# Patient Record
Sex: FEMALE | Race: WHITE | Employment: OTHER | URBAN - METROPOLITAN AREA
[De-identification: names, ages, dates, MRNs, and addresses within clinical notes are randomized per-mention and may not be internally consistent; named-entity substitution may affect disease eponyms.]

---

## 2017-01-10 ENCOUNTER — ALLSCRIPTS OFFICE VISIT (OUTPATIENT)
Dept: OTHER | Facility: OTHER | Age: 52
End: 2017-01-10

## 2017-02-15 ENCOUNTER — ALLSCRIPTS OFFICE VISIT (OUTPATIENT)
Dept: OTHER | Facility: OTHER | Age: 52
End: 2017-02-15

## 2017-03-07 ENCOUNTER — ALLSCRIPTS OFFICE VISIT (OUTPATIENT)
Dept: OTHER | Facility: OTHER | Age: 52
End: 2017-03-07

## 2017-03-07 DIAGNOSIS — R10.2 PELVIC AND PERINEAL PAIN: ICD-10-CM

## 2017-03-07 LAB
BILIRUB UR QL STRIP: NORMAL
CLARITY UR: NORMAL
COLOR UR: NORMAL
GLUCOSE (HISTORICAL): 50
HCG, QUALITATIVE (HISTORICAL): NEGATIVE
HGB UR QL STRIP.AUTO: NORMAL
KETONES UR STRIP-MCNC: NORMAL MG/DL
LEUKOCYTE ESTERASE UR QL STRIP: 25
NITRITE UR QL STRIP: NORMAL
PH UR STRIP.AUTO: 6.5 [PH]
PROT UR STRIP-MCNC: NORMAL MG/DL
SP GR UR STRIP.AUTO: 1.02
UROBILINOGEN UR QL STRIP.AUTO: NORMAL

## 2017-03-09 LAB
CULTURE RESULT (HISTORICAL): NORMAL
MISCELLANEOUS LAB TEST RESULT (HISTORICAL): NORMAL

## 2017-03-21 ENCOUNTER — HOSPITAL ENCOUNTER (OUTPATIENT)
Dept: RADIOLOGY | Facility: HOSPITAL | Age: 52
Discharge: HOME | End: 2017-03-21
Attending: FAMILY MEDICINE
Payer: COMMERCIAL

## 2017-03-21 DIAGNOSIS — R10.2 PELVIC AND PERINEAL PAIN: ICD-10-CM

## 2017-03-21 PROCEDURE — 76830 TRANSVAGINAL US NON-OB: CPT

## 2017-03-21 PROCEDURE — 76856 US EXAM PELVIC COMPLETE: CPT

## 2017-04-04 ENCOUNTER — ALLSCRIPTS OFFICE VISIT (OUTPATIENT)
Dept: OTHER | Facility: OTHER | Age: 52
End: 2017-04-04

## 2017-06-20 ENCOUNTER — ALLSCRIPTS OFFICE VISIT (OUTPATIENT)
Dept: OTHER | Facility: OTHER | Age: 52
End: 2017-06-20

## 2017-06-20 DIAGNOSIS — M54.6 PAIN IN THORACIC SPINE: ICD-10-CM

## 2017-06-20 DIAGNOSIS — M54.2 CERVICALGIA: ICD-10-CM

## 2017-06-21 ENCOUNTER — TRANSCRIBE ORDERS (OUTPATIENT)
Dept: ADMINISTRATIVE | Facility: HOSPITAL | Age: 52
End: 2017-06-21

## 2017-06-21 ENCOUNTER — HOSPITAL ENCOUNTER (OUTPATIENT)
Dept: RADIOLOGY | Facility: HOSPITAL | Age: 52
Discharge: HOME | End: 2017-06-21
Attending: FAMILY MEDICINE
Payer: COMMERCIAL

## 2017-06-21 DIAGNOSIS — M54.2 CERVICALGIA: ICD-10-CM

## 2017-06-21 PROCEDURE — 72050 X-RAY EXAM NECK SPINE 4/5VWS: CPT

## 2017-06-21 PROCEDURE — 72072 X-RAY EXAM THORAC SPINE 3VWS: CPT

## 2017-07-25 ENCOUNTER — ALLSCRIPTS OFFICE VISIT (OUTPATIENT)
Dept: OTHER | Facility: OTHER | Age: 52
End: 2017-07-25

## 2017-08-02 ENCOUNTER — APPOINTMENT (OUTPATIENT)
Dept: RADIOLOGY | Facility: CLINIC | Age: 52
End: 2017-08-02
Payer: COMMERCIAL

## 2017-08-02 ENCOUNTER — ALLSCRIPTS OFFICE VISIT (OUTPATIENT)
Dept: OTHER | Facility: OTHER | Age: 52
End: 2017-08-02

## 2017-08-02 DIAGNOSIS — M54.9 DORSALGIA: ICD-10-CM

## 2017-08-02 PROCEDURE — 72100 X-RAY EXAM L-S SPINE 2/3 VWS: CPT

## 2017-08-14 ENCOUNTER — APPOINTMENT (OUTPATIENT)
Dept: PHYSICAL THERAPY | Facility: CLINIC | Age: 52
End: 2017-08-14
Payer: COMMERCIAL

## 2017-08-14 PROCEDURE — 97162 PT EVAL MOD COMPLEX 30 MIN: CPT

## 2017-08-16 ENCOUNTER — GENERIC CONVERSION - ENCOUNTER (OUTPATIENT)
Dept: OTHER | Facility: OTHER | Age: 52
End: 2017-08-16

## 2017-08-21 ENCOUNTER — APPOINTMENT (OUTPATIENT)
Dept: PHYSICAL THERAPY | Facility: CLINIC | Age: 52
End: 2017-08-21
Payer: COMMERCIAL

## 2017-08-21 PROCEDURE — 97110 THERAPEUTIC EXERCISES: CPT

## 2017-08-21 PROCEDURE — 97140 MANUAL THERAPY 1/> REGIONS: CPT

## 2017-08-23 ENCOUNTER — APPOINTMENT (OUTPATIENT)
Dept: PHYSICAL THERAPY | Facility: CLINIC | Age: 52
End: 2017-08-23
Payer: COMMERCIAL

## 2017-08-23 PROCEDURE — 97140 MANUAL THERAPY 1/> REGIONS: CPT

## 2017-08-23 PROCEDURE — 97110 THERAPEUTIC EXERCISES: CPT

## 2017-08-28 ENCOUNTER — APPOINTMENT (OUTPATIENT)
Dept: PHYSICAL THERAPY | Facility: CLINIC | Age: 52
End: 2017-08-28
Payer: COMMERCIAL

## 2017-08-28 PROCEDURE — 97110 THERAPEUTIC EXERCISES: CPT

## 2017-08-28 PROCEDURE — 97140 MANUAL THERAPY 1/> REGIONS: CPT

## 2017-08-30 ENCOUNTER — APPOINTMENT (OUTPATIENT)
Dept: PHYSICAL THERAPY | Facility: CLINIC | Age: 52
End: 2017-08-30
Payer: COMMERCIAL

## 2017-08-30 PROCEDURE — 97110 THERAPEUTIC EXERCISES: CPT

## 2017-09-07 ENCOUNTER — TRANSCRIBE ORDERS (OUTPATIENT)
Dept: ADMINISTRATIVE | Facility: HOSPITAL | Age: 52
End: 2017-09-07

## 2017-09-07 DIAGNOSIS — Z12.31 VISIT FOR SCREENING MAMMOGRAM: Primary | ICD-10-CM

## 2017-09-12 ENCOUNTER — HOSPITAL ENCOUNTER (OUTPATIENT)
Dept: RADIOLOGY | Facility: HOSPITAL | Age: 52
Discharge: HOME | End: 2017-09-12
Attending: OBSTETRICS & GYNECOLOGY
Payer: COMMERCIAL

## 2017-09-12 DIAGNOSIS — Z12.31 VISIT FOR SCREENING MAMMOGRAM: ICD-10-CM

## 2017-09-12 PROCEDURE — 77063 BREAST TOMOSYNTHESIS BI: CPT

## 2017-09-12 PROCEDURE — G0202 SCR MAMMO BI INCL CAD: HCPCS

## 2017-10-12 ENCOUNTER — GENERIC CONVERSION - ENCOUNTER (OUTPATIENT)
Dept: OTHER | Facility: OTHER | Age: 52
End: 2017-10-12

## 2017-10-27 ENCOUNTER — GENERIC CONVERSION - ENCOUNTER (OUTPATIENT)
Dept: OTHER | Facility: OTHER | Age: 52
End: 2017-10-27

## 2017-11-27 ENCOUNTER — ALLSCRIPTS OFFICE VISIT (OUTPATIENT)
Dept: OTHER | Facility: OTHER | Age: 52
End: 2017-11-27

## 2017-11-27 DIAGNOSIS — G89.4 CHRONIC PAIN SYNDROME: ICD-10-CM

## 2017-11-27 DIAGNOSIS — M54.50 LOW BACK PAIN: ICD-10-CM

## 2017-11-27 DIAGNOSIS — M54.16 RADICULOPATHY OF LUMBAR REGION: ICD-10-CM

## 2017-12-14 ENCOUNTER — GENERIC CONVERSION - ENCOUNTER (OUTPATIENT)
Dept: OTHER | Facility: OTHER | Age: 52
End: 2017-12-14

## 2017-12-21 ENCOUNTER — GENERIC CONVERSION - ENCOUNTER (OUTPATIENT)
Dept: OTHER | Facility: OTHER | Age: 52
End: 2017-12-21

## 2017-12-22 ENCOUNTER — HOSPITAL ENCOUNTER (OUTPATIENT)
Dept: RADIOLOGY | Facility: HOSPITAL | Age: 52
Discharge: HOME | End: 2017-12-22
Attending: ANESTHESIOLOGY
Payer: COMMERCIAL

## 2017-12-22 DIAGNOSIS — M54.50 LOW BACK PAIN: ICD-10-CM

## 2017-12-22 DIAGNOSIS — G89.4 CHRONIC PAIN SYNDROME: ICD-10-CM

## 2017-12-22 DIAGNOSIS — M54.16 RADICULOPATHY OF LUMBAR REGION: ICD-10-CM

## 2017-12-22 PROCEDURE — 72148 MRI LUMBAR SPINE W/O DYE: CPT

## 2018-01-02 ENCOUNTER — GENERIC CONVERSION - ENCOUNTER (OUTPATIENT)
Dept: OTHER | Facility: OTHER | Age: 53
End: 2018-01-02

## 2018-01-08 ENCOUNTER — GENERIC CONVERSION - ENCOUNTER (OUTPATIENT)
Dept: OTHER | Facility: OTHER | Age: 53
End: 2018-01-08

## 2018-01-08 ENCOUNTER — ALLSCRIPTS OFFICE VISIT (OUTPATIENT)
Dept: OTHER | Facility: OTHER | Age: 53
End: 2018-01-08

## 2018-01-08 DIAGNOSIS — M25.571 PAIN IN RIGHT ANKLE: ICD-10-CM

## 2018-01-09 NOTE — CONSULTS
Assessment   1  Lumbar radiculopathy (724 4) (M54 16)   2  Right ankle pain (719 47) (M25 571)    Plan    · Meloxicam 7 5 MG Oral Tablet; TAKE 1 TABLET TWICE DAILY WITH FOOD   · *1 - [facility]  *  Status: Active  Requested for: 87ACO9944  Care Summary provided  : Yes   · Follow-up PRN Evaluation and Treatment  Follow-up  Status: Complete  Done:    98OBP9908 04:10PM   · We recommend that you stretch your plantar fascia using a step or stair  Do this exercise    10 times in a row, 3 times a day , and hold the stretch for 10 seconds each    time ; Status:Complete;   Done: 02HJT5869 04:10PM   · Orthotics B/L; Status:Complete;   Done: 58IEK2813 04:10PM    Discussion/Summary   The patient was counseled regarding diagnostic results,-- instructions for management,-- prognosis,-- patient and family education,-- risks and benefits of treatment options  Patient is able to Self-Care  Possible side effects of new medications were reviewed with the patient/guardian today  The treatment plan was reviewed with the patient/guardian  The patient/guardian understands and agrees with the treatment plan      Chief Complaint   Right ankle pain      History of Present Illness   HPI: Patient has pain in her right ankle and foot area  This has been ongoing for 1 month  No history of trauma  Review of Systems      ROS reviewed  Constitutional: no fever,-- no recent weight gain-- and-- no recent weight loss  Eyes: no double vision-- and-- no blurry vision  Cardiovascular: no chest pain,-- no palpitations-- and-- no lower extremity edema  Respiratory: no complaints of shortness of breath-- and-- no wheezing  Musculoskeletal: no difficulty walking,-- no muscle weakness,-- no joint stiffness,-- no joint swelling,-- no limb swelling`,-- no pain in extremity-- and-- no decreased range of motion        Neurological: no dizziness,-- no difficulty swallowing,-- no memory loss,-- no loss of consciousness-- and-- no seizures  Gastrointestinal: no nausea,-- no vomiting,-- no constipation-- and-- no diarrhea  Genitourinary: no difficulty initiating urine stream,-- no genital pain-- and-- no frequent urination  Integumentary: no complaints of skin rash  Psychiatric: no depression  Endocrine: no excessive thirst,-- no adrenal disease,-- no hypothyroidism-- and-- no hyperthyroidism  Hematologic/Lymphatic: no tendency for easy bruising-- and-- no tendency for easy bleeding  Active Problems   1  Acquired ankle/foot deformity (736 70) (M21 969)   2  Alopecia areata (704 01) (L63 9)   3  Back pain (724 5) (M54 9)   4  Bilateral sciatica (724 3) (M54 31,M54 32)   5  Bony exostosis (726 91) (M89 8X9)   6  Chronic low back pain (724 2,338 29) (M54 5,G89 29)   7  Chronic pain syndrome (338 4) (G89 4)   8  Esophageal reflux (530 81) (K21 9)   9  Influenza vaccination declined (V64 06) (Z28 21)   10  Lumbar radiculopathy (724 4) (M54 16)   11  Migraine (346 90) (G43 909)   12  Pes planus, congenital (754 61) (Q66 50)   13  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)   14  Right ankle pain (719 47) (M25 571)   15  Subconjunctival hemorrhage of left eye (372 72) (H11 32)   16   Thoracic spine pain (724 1) (M54 6)    Past Medical History    · History of Acute back pain (724 5) (M54 9)   · History of Difficulty walking (719 7) (R26 2)   · History of acute pharyngitis (V12 69) (Z87 09)   · History of allergic rhinitis (V12 69) (Z87 09)   · History of conjunctivitis (V12 49) (Z86 69)   · History of contact dermatitis (V13 3) (Z87 2)   · History of screening mammography (V15 89) (Z92 89)   · History of vaginal bleeding (V13 29) (Z87 42)   · History of vaginal discharge (V13 29) (Z87 42)   · History of Left lumbar radiculopathy (724 4) (M54 16)   · History of Left shoulder pain (719 41) (M25 512)   · History of Leg pain (729 5) (M79 606)   · History of Normal breast exam   · History of Pelvic pain in female (625 9) (R10 2)   · History of Screening for depression (V79 0) (Z13 89)   · History of Skin rash (782 1) (R21)   · History of Trace mitral regurgitation by prior echocardiogram (424 0) (I34 0)   · History of Trace tricuspid regurgitation by prior echocardiogram (397 0) (I07 1)   · History of Vaginitis due to Candida (112 1) (B37 3)     The active problems and past medical history were reviewed and updated today  Surgical History    · History of Hysterectomy     The surgical history was reviewed and updated today  Family History   Aunt    · Family history of leukemia (V16 6) (Z80 6)  Uncle    · Family history of malignant melanoma of skin (V16 8) (Z80 8)  Family History    · Family history of heart disease (V17 49) (Z82 49)   · Family history of hypertension (V17 49) (Z82 49)   · Family history of thyroid disease (V18 19) (Z83 49)     The family history was reviewed and updated today  Social History    ·    · Never a smoker   · Occasional alcohol use  The social history was reviewed and updated today  The social history was reviewed and is unchanged  Current Meds    1  Daily Value Multivitamin Oral Tablet; Take 1 tablet daily Recorded   2  Ibuprofen 600 MG Oral Tablet; TAKE 1 TABLET Every 6 hours PRN pain After Meals; Therapy: 31EPR6865 to (Evaluate:21Apr2017)  Requested for: 50UGG0811; Last     Rx:07Mar2017 Ordered     The medication list was reviewed and updated today  Allergies   1  Penicillins  2  No Known Food Allergies   3  No Known Latex Allergies    Vitals    Recorded: 08NYM7522 03:52PM   Heart Rate 80   Respiration 17   Systolic 694   Diastolic 72   Height 5 ft 3 in   Weight 118 lb    BMI Calculated 20 9   BSA Calculated 1 55     Physical Exam   Left Foot: Appearance: Normal except as noted: excessive pronation-- and-- pes planus  Tenderness: None  Right Foot: Appearance: Normal except as noted: excessive pronation-- and-- pes planus   Tenderness: None except the lateral hindfoot,-- lateral midfoot-- and-- peroneal tendon  Left Ankle: ROM: Full  Motor: Normal     Right Ankle: Tenderness: None except the peroneal tendons-- and-- posterolateral region  ROM: Full  Motor: Normal     Neurological Exam: performed  Light touch was intact bilaterally  Vibratory sensation was intact bilaterally  Response to monofilament test was intact bilaterally  Deep tendon reflexes: patellar reflex present bilaterally-- and-- achilles reflex present bilaterally  Vascular Exam: performed Dorsalis pedis pulses were present bilaterally  Posterior tibial pulses were present bilaterally  Elevation Pallor: absent bilaterally  Dependence rubor was absent bilaterally  Edema: none  Toenails: All of the toenails were elongated-- and-- hypertrophied  Hyperkeratosis: present on both first toes  Shoe Gear Evaluation: performed ()  Recommendation(s): custom inlays  Results/Data   I personally reviewed the films/images/results in the office today  My interpretation follows  X-ray Review Mild rearfoot osteoarthritis noted  No evidence of fracture or bony mass  Procedure   X-rays taken  Patient will be referred for physical therapy  We will add anti-inflammatory medicine  She will stretch daily  Her feet have been casted for custom molded foot orthotics      Future Appointments      Date/Time Provider Specialty Site   01/18/2018 03:30 PM ARLENE Sandoval  Pain Management [facility]   03/19/2018 01:15 PM ARLENE Sandoval   Pain Management [facility] SPINE     Signatures    Electronically signed by : Marcela Villaseñor DPM; Jan 8 2018  4:11PM EST                       (Author)

## 2018-01-12 VITALS
HEART RATE: 84 BPM | TEMPERATURE: 98 F | DIASTOLIC BLOOD PRESSURE: 64 MMHG | HEIGHT: 63 IN | BODY MASS INDEX: 21.53 KG/M2 | SYSTOLIC BLOOD PRESSURE: 118 MMHG | WEIGHT: 121.5 LBS

## 2018-01-13 VITALS
OXYGEN SATURATION: 99 % | SYSTOLIC BLOOD PRESSURE: 130 MMHG | WEIGHT: 127.56 LBS | HEART RATE: 86 BPM | DIASTOLIC BLOOD PRESSURE: 80 MMHG | BODY MASS INDEX: 22.6 KG/M2 | HEIGHT: 63 IN | RESPIRATION RATE: 16 BRPM

## 2018-01-13 VITALS
SYSTOLIC BLOOD PRESSURE: 112 MMHG | WEIGHT: 131 LBS | DIASTOLIC BLOOD PRESSURE: 60 MMHG | BODY MASS INDEX: 23.21 KG/M2 | HEART RATE: 83 BPM | OXYGEN SATURATION: 98 % | HEIGHT: 63 IN | TEMPERATURE: 98.1 F | RESPIRATION RATE: 18 BRPM

## 2018-01-14 VITALS
DIASTOLIC BLOOD PRESSURE: 60 MMHG | HEIGHT: 63 IN | SYSTOLIC BLOOD PRESSURE: 102 MMHG | WEIGHT: 132 LBS | TEMPERATURE: 97.6 F | HEART RATE: 86 BPM | OXYGEN SATURATION: 99 % | BODY MASS INDEX: 23.39 KG/M2

## 2018-01-14 VITALS
HEIGHT: 63 IN | BODY MASS INDEX: 23.04 KG/M2 | WEIGHT: 130 LBS | HEART RATE: 76 BPM | RESPIRATION RATE: 16 BRPM | TEMPERATURE: 96.2 F | SYSTOLIC BLOOD PRESSURE: 98 MMHG | DIASTOLIC BLOOD PRESSURE: 60 MMHG

## 2018-01-14 VITALS
WEIGHT: 129.31 LBS | SYSTOLIC BLOOD PRESSURE: 110 MMHG | DIASTOLIC BLOOD PRESSURE: 68 MMHG | HEIGHT: 63 IN | OXYGEN SATURATION: 98 % | BODY MASS INDEX: 22.91 KG/M2 | HEART RATE: 75 BPM | RESPIRATION RATE: 18 BRPM | TEMPERATURE: 98.1 F

## 2018-01-14 VITALS
DIASTOLIC BLOOD PRESSURE: 79 MMHG | HEART RATE: 88 BPM | SYSTOLIC BLOOD PRESSURE: 125 MMHG | HEIGHT: 63 IN | WEIGHT: 127 LBS | BODY MASS INDEX: 22.5 KG/M2

## 2018-01-14 VITALS
RESPIRATION RATE: 18 BRPM | WEIGHT: 127 LBS | SYSTOLIC BLOOD PRESSURE: 120 MMHG | OXYGEN SATURATION: 99 % | BODY MASS INDEX: 22.5 KG/M2 | HEIGHT: 63 IN | DIASTOLIC BLOOD PRESSURE: 70 MMHG | HEART RATE: 76 BPM

## 2018-01-15 ENCOUNTER — GENERIC CONVERSION - ENCOUNTER (OUTPATIENT)
Dept: OTHER | Facility: OTHER | Age: 53
End: 2018-01-15

## 2018-01-18 ENCOUNTER — HOSPITAL ENCOUNTER (OUTPATIENT)
Facility: AMBULARY SURGERY CENTER | Age: 53
Setting detail: OUTPATIENT SURGERY
Discharge: HOME | End: 2018-01-18
Attending: ANESTHESIOLOGY | Admitting: ANESTHESIOLOGY
Payer: COMMERCIAL

## 2018-01-18 ENCOUNTER — APPOINTMENT (OUTPATIENT)
Dept: RADIOLOGY | Facility: HOSPITAL | Age: 53
End: 2018-01-18
Payer: COMMERCIAL

## 2018-01-18 VITALS
TEMPERATURE: 98 F | HEART RATE: 70 BPM | OXYGEN SATURATION: 99 % | RESPIRATION RATE: 18 BRPM | DIASTOLIC BLOOD PRESSURE: 80 MMHG | SYSTOLIC BLOOD PRESSURE: 135 MMHG

## 2018-01-18 PROBLEM — M51.17 INTERVERTEBRAL DISC DISORDER WITH RADICULOPATHY OF LUMBOSACRAL REGION: Status: ACTIVE | Noted: 2018-01-18

## 2018-01-18 PROBLEM — G89.4 CHRONIC PAIN SYNDROME: Status: ACTIVE | Noted: 2018-01-18

## 2018-01-18 PROBLEM — M54.50 LOW BACK PAIN: Status: ACTIVE | Noted: 2018-01-18

## 2018-01-18 PROCEDURE — 72020 X-RAY EXAM OF SPINE 1 VIEW: CPT

## 2018-01-18 RX ORDER — DIPHENOXYLATE HYDROCHLORIDE AND ATROPINE SULFATE 2.5; .025 MG/1; MG/1
1 TABLET ORAL DAILY
COMMUNITY
End: 2019-09-17

## 2018-01-18 RX ORDER — LIDOCAINE WITH 8.4% SOD BICARB 0.9%(10ML)
SYRINGE (ML) INJECTION AS NEEDED
Status: DISCONTINUED | OUTPATIENT
Start: 2018-01-18 | End: 2018-01-18 | Stop reason: HOSPADM

## 2018-01-18 RX ORDER — METHYLPREDNISOLONE ACETATE 80 MG/ML
INJECTION, SUSPENSION INTRA-ARTICULAR; INTRALESIONAL; INTRAMUSCULAR; SOFT TISSUE AS NEEDED
Status: DISCONTINUED | OUTPATIENT
Start: 2018-01-18 | End: 2018-01-18 | Stop reason: HOSPADM

## 2018-01-18 RX ORDER — BUPIVACAINE HYDROCHLORIDE 2.5 MG/ML
INJECTION, SOLUTION EPIDURAL; INFILTRATION; INTRACAUDAL AS NEEDED
Status: DISCONTINUED | OUTPATIENT
Start: 2018-01-18 | End: 2018-01-18 | Stop reason: HOSPADM

## 2018-01-18 NOTE — DISCHARGE INSTRUCTIONS
Epidural Steroid Injection   WHAT YOU NEED TO KNOW:   An epidural steroid injection (ROMAN) is a procedure to inject steroid medicine into the epidural space  The epidural space is between your spinal cord and vertebrae  Steroids reduce inflammation and fluid buildup in your spine that may be causing pain  You may be given pain medicine along with the steroids  ACTIVITY  · Do not drive or operate machinery today  · No strenuous activity today - bending, lifting, etc   · You may resume normal activites starting tomorrow - start slowly and as tolerated  · You may shower today, but no tub baths or hot tubs  · You may have numbness for several hours from the local anesthetic  Please use caution and common sense, especially with weight-bearing activities  CARE OF THE INJECTION SITE  · If you have soreness or pain, apply ice to the area today (20 minutes on/20 minutes off)  · Starting tomorrow, you may use warm, moist heat or ice if needed  · You may have an increase or change in your discomfort for 36-48 hours after your treatment  · Apply ice and continue with any pain medication you have been prescribed  · Notify [facility] if you have any of the following: redness, drainage, swelling, headache, stiff neck or fever above 100°F     SPECIAL INSTRUCTIONS  · Our office will contact you in approximately 7 days for a progress report  MEDICATIONS  · Continue to take all routine medications  · Our office may have instructed you to hold some medications  If you have a problem specifically related to your procedure, please call our office at [phone]  Problems not related to your procedure should be directed to your primary care physician

## 2018-01-18 NOTE — OP NOTE
ATTENDING PHYSICIAN:  Blas Gray MD     PROCEDURE:  1  Right L5 transforaminal epidural steroid injection under fluoroscopic guidance  2  Right S1 transforaminal epidural steroid injection under fluoroscopic guidance  PRE-PROCEDURE DIAGNOSIS:  Low back pain and right lower extremity radiculopathy  POST-PROCEDURE DIAGNOSIS:   Low back pain and right lower extremity radiculopathy  ANESTHESIA:  Local     ESTIMATED BLOOD LOSS:  Minimal     COMPLICATIONS:  None  LOCATION:  [address] [place] [address]  CONSENT:  Today's procedure, its potential benefits as well as its risks and potential side effects were reviewed  Discussed risks of the procedure including bleeding, infection, nerve irritation or damage, reactions to the medications, weakness, headache, failure of the pain to improve, and potential worsening of the pain were explained to the patient who verbalized understanding and who wished to proceed  Written informed consent was thereby obtained  DESCRIPTION OF THE PROCEDURE:  After written informed consent was obtained, the patient was taken to the fluoroscopy suite and placed in the prone position  Anatomical landmarks were identified by way of fluoroscopy in multiple views  The skin of the lumbar region was prepped using antiseptic and draped in the usual sterile fashion  Strict aseptic technique was utilized  The skin and subcutaneous tissues at the needle entry site were infiltrated with a total of 5 mL of 1% preservative-free lidocaine using a 25-gauge 1-1/2-inch needle  22-gauge needles were then incrementally advanced under fluoroscopic guidance in the oblique view into the neural foramina as mentioned above  Proper placement into each of the neural foramen was confirmed with fluoroscopy in both the lateral and AP views   After negative aspiration for CSF or heme, contrast was injected, which delineated the nerve roots and the epidural space under fluoroscopy in the AP view  There was only a transient pressure paresthesia that resolved immediately upon injection  After negative aspiration, a 2 mL of a 4 mL injectate consisting of 3 mL of preservative-free 0 25% bupivacaine and 1 mL of Depo-Medrol 80 mg/mL was slowly injected into each of the needles as delineated above  The patient tolerated the procedure well and all needles were removed intact  Hemostasis was maintained  There were no apparent paresthesias or complications  The skin was wiped clean and a Band-Aid was placed as appropriate  The patient was monitored for an appropriate period of time and remained hemodynamically stable following the procedure  The patient was ultimately discharged to home with supervision in good condition and instructed to call the office in approximately 7-10 days with an update or sooner as warranted  Discharge instructions were provided  I was present for and participated in all key and critical portions of this procedure      Yu Mckenzie MD  1/18/2018  4:01 PM

## 2018-01-22 VITALS
RESPIRATION RATE: 18 BRPM | DIASTOLIC BLOOD PRESSURE: 60 MMHG | SYSTOLIC BLOOD PRESSURE: 99 MMHG | HEIGHT: 63 IN | OXYGEN SATURATION: 100 % | WEIGHT: 126 LBS | BODY MASS INDEX: 22.32 KG/M2 | HEART RATE: 89 BPM

## 2018-01-23 VITALS
HEART RATE: 80 BPM | HEIGHT: 63 IN | RESPIRATION RATE: 17 BRPM | BODY MASS INDEX: 20.91 KG/M2 | WEIGHT: 118 LBS | DIASTOLIC BLOOD PRESSURE: 72 MMHG | SYSTOLIC BLOOD PRESSURE: 126 MMHG

## 2018-01-23 NOTE — MISCELLANEOUS
Message   Recorded as Task   Date: 12/26/2017 10:48 AM, Created By: [address]   Task Name: Care Coordination   Assigned To: Omar Higginbotham   Regarding Patient: Quinn Riddle, Status: In Progress   Comment:    Ya Oreilly - 26 Dec 2017 10:48 AM     TASK CREATED  MRI of the lumbar spine dated December 22, 2017 shows degenerative disc disease at L3-L4 and L4-L5 along with a central disc herniation at L5-S1  Since the patient's symptoms are primarily on the right side, I think she would benefit from a right L5 and right S1 transforaminal epidural steroid injection  If she is interested, please schedule:    (For [place])  Proc:  Right L5 and S1 TFESI  Dx:  M51 17, M54 5, G89 4  CPT:  [id number] and 76455   Maddie Coreas - 26 Dec 2017 1:48 PM     TASK EDITED  Attempted to reach pt LVMMOM with c/b #, office hours and location  Coreen Field - 27 Dec 2017 12:56 PM     TASK EDITED  Pt came into the office today , this nurse went over mri results injection was scheduled  Ya Oreilly - 27 Dec 2017 1:00 PM     TASK REPLIED TO: Previously Assigned To [place]  Thanks  Please send this to surg scheduling team to see if they need to get authorization  Coreen Field - 27 Dec 2017 2:06 PM     TASK REASSIGNED: Previously Assigned To Macdonald Sicard Fox,Amy - 28 Dec 2017 4:12 PM     TASK EDITED  noted, will look into auth for TFESI as noted below   Morelia Gan - 29 Dec 2017 3:24 PM     TASK EDITED  Scheduled for 1/18/18    [address] [place] [address] along with clinicals  Await response  Omar Ashraf - 33 Jan 2018 11:34 AM     TASK EDITED  Noted, will await fax response from ins company  Active Problems    1  Acquired ankle/foot deformity (736 70) (M21 969)   2  Alopecia areata (704 01) (L63 9)   3  Back pain (724 5) (M54 9)   4  Bilateral sciatica (724 3) (M54 31,M54 32)   5  Bony exostosis (726 91) (M89 8X9)   6  Chronic low back pain (724 2,338 29) (M54 5,G89 29)   7   Chronic pain syndrome (338 4) (G89 4)   8  Esophageal reflux (530 81) (K21 9)   9  Influenza vaccination declined (V64 06) (Z28 21)   10  Lumbar radiculopathy (724 4) (M54 16)   11  Migraine (346 90) (G43 909)   12  Pes planus, congenital (754 61) (Q66 50)   13  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)   14  Right ankle pain (719 47) (M25 571)   15  Subconjunctival hemorrhage of left eye (372 72) (H11 32)   16  Thoracic spine pain (724 1) (M54 6)    Current Meds   1  Daily Value Multivitamin Oral Tablet; Take 1 tablet daily Recorded   2  Ibuprofen 600 MG Oral Tablet; TAKE 1 TABLET Every 6 hours PRN pain After Meals; Therapy: 82USA0160 to (Evaluate:21Apr2017)  Requested for: 58FAC2301; Last   Rx:07Mar2017 Ordered    Allergies    1  Penicillins    2  No Known Food Allergies   3   No Known Latex Allergies    Signatures   Electronically signed by : Didi Guzman, ; Jan 2 2018 11:34AM EST                       (Author)

## 2018-01-23 NOTE — RESULT NOTES
Message   Recorded as Task   Date: 12/18/2017 08:24 AM, Created By: System   Task Name: Schedule Appointment   Assigned To: [facility] surgery sched,Team   Regarding Patient: Tenzin Herrera, Status: Active   Comment:    System - 18 Dec 2017 8:24 AM        Dee Phipps - 18 Dec 2017 8:26 AM     TASK REASSIGNED: Previously Assigned To Carol Gaitan #[id number] VALID FROM 11/30/17 TO 1/29/2018 PLEASE Elizabeth Perez - 18 Dec 2017 10:52 AM     TASK IN PROGRESS   Janee Gresham - 18 Dec 2017 1:25 PM     TASK EDITED  12/18/17 [facility] @ [phone]   Elizabeth Yañez - 21 Dec 2017 11:50 AM     TASK EDITED  12/22 dave aly

## 2018-01-23 NOTE — MISCELLANEOUS
Message   Recorded as Task   Date: 01/15/2018 11:39 AM, Created By: Keshawn Baez   Task Name: Call Back   Assigned To: [address] [facility] [facility] Clinical,Team   Regarding Patient: Tenzin Herrera, Status: Active   Comment:    Keshawn Baez - 15 Skinny 2018 11:39 AM     TASK CREATED  Patient came in [facility] office stating she has a procedure on 01/18 rt Tfesi  Pt states  her foot Dr gave her Meloxicam 7 5 mg to take once a day she started taking medication last week and suppose to take medication this week as well  She would like to know if she can still have procedure done or if she would have to cancel  Please call patient at   Carissa Gaston - 15 Skinny 2018 11:44 AM     TASK EDITED  LMOM to c/b, c/b# and OH given  Mound Fluke - 15 Skinny 9814 1:71 PM     TASK EDITED  patient returned your call  please call her at [phone]   [facility] - 15 Skinny 2018 3:44 PM     TASK EDITED  S/w pt  to advise her that it is okay that she is taking the Meloxicam, it doesn not need to be held for a TFESI  Pt  wanted to make sure, verbalized understanding  Active Problems    1  Acquired ankle/foot deformity (736 70) (M21 969)   2  Alopecia areata (704 01) (L63 9)   3  Back pain (724 5) (M54 9)   4  Bilateral sciatica (724 3) (M54 31,M54 32)   5  Bony exostosis (726 91) (M89 8X9)   6  Chronic low back pain (724 2,338 29) (M54 5,G89 29)   7  Chronic pain syndrome (338 4) (G89 4)   8  Esophageal reflux (530 81) (K21 9)   9  Influenza vaccination declined (V64 06) (Z28 21)   10  Lumbar radiculopathy (724 4) (M54 16)   11  Migraine (346 90) (G43 909)   12  Pes planus, congenital (754 61) (Q66 50)   13  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)   14  Right ankle pain (719 47) (M25 571)   15  Subconjunctival hemorrhage of left eye (372 72) (H11 32)   16  Thoracic spine pain (724 1) (M54 6)    Current Meds   1  Daily Value Multivitamin Oral Tablet; Take 1 tablet daily Recorded   2   Ibuprofen 600 MG Oral Tablet; TAKE 1 TABLET Every 6 hours PRN pain After Meals; Therapy: 89TYA6713 to (Evaluate:21Apr2017)  Requested for: 55PEY3368; Last   Rx:07Mar2017 Ordered   3  Meloxicam 7 5 MG Oral Tablet; TAKE 1 TABLET TWICE DAILY WITH FOOD; Therapy: 74OJP2544 to (Casimer Sacks)  Requested for: 78GAO7255; Last   Rx:08Jan2018 Ordered    Allergies    1  Penicillins    2  No Known Food Allergies   3   No Known Latex Allergies    Signatures   Electronically signed by : Christa York, ; Skinny 15 2018  3:45PM EST                       (Author)

## 2018-01-24 VITALS
BODY MASS INDEX: 20.91 KG/M2 | WEIGHT: 118 LBS | HEIGHT: 63 IN | HEART RATE: 88 BPM | DIASTOLIC BLOOD PRESSURE: 74 MMHG | TEMPERATURE: 97 F | SYSTOLIC BLOOD PRESSURE: 108 MMHG

## 2018-01-26 ENCOUNTER — TELEPHONE (OUTPATIENT)
Dept: PAIN MEDICINE | Facility: CLINIC | Age: 53
End: 2018-01-26

## 2018-01-26 NOTE — TELEPHONE ENCOUNTER
1ST ATTEMPT, LM ON VM NEEDED % OF RELIEF     F/U 03/19/2018        S/P L5 S1 TFESI W/ AS ON 1/18/2018

## 2018-01-29 NOTE — TELEPHONE ENCOUNTER
[facility]- message was left on anserve  I called patient back w/ no answer  LVM for patient to c/b       Given to:             [facility]    Reason: ROUTINE/OFFICE   Pt's Dr: Bert Tanner       For: OFFICE     2nd Call: NO        From: [address] [place] [address] [address]     Phone: [phone]   Ext:     Pt Name: Pj Sterling    Pt : 1965     Message: PT Kristine Bonilla

## 2018-03-01 ENCOUNTER — OFFICE VISIT (OUTPATIENT)
Dept: PODIATRY | Facility: CLINIC | Age: 53
End: 2018-03-01
Payer: COMMERCIAL

## 2018-03-01 VITALS
HEART RATE: 76 BPM | HEIGHT: 63 IN | DIASTOLIC BLOOD PRESSURE: 78 MMHG | SYSTOLIC BLOOD PRESSURE: 115 MMHG | WEIGHT: 117 LBS | BODY MASS INDEX: 20.73 KG/M2

## 2018-03-01 DIAGNOSIS — M76.71 PERONEAL TENDINITIS OF RIGHT LOWER EXTREMITY: ICD-10-CM

## 2018-03-01 DIAGNOSIS — M21.961 ACQUIRED DEFORMITY OF RIGHT FOOT: Primary | ICD-10-CM

## 2018-03-01 DIAGNOSIS — M25.571 ARTHRALGIA OF RIGHT FOOT: ICD-10-CM

## 2018-03-01 PROCEDURE — 99213 OFFICE O/P EST LOW 20 MIN: CPT | Performed by: PODIATRIST

## 2018-03-01 RX ORDER — MELOXICAM 7.5 MG/1
7.5 TABLET ORAL DAILY
Qty: 30 TABLET | Refills: 0 | Status: SHIPPED | OUTPATIENT
Start: 2018-03-01 | End: 2018-04-25 | Stop reason: ALTCHOICE

## 2018-03-01 NOTE — PROGRESS NOTES
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  Plan  Trigger point injection done to the area of the right lateral malleolus  1 cc of dexamethasone was injected without pain or complication  Patient's right arch was bound a low dye arch strapping fashion  No problem-specific Assessment & Plan notes found for this encounter  There are no diagnoses linked to this encounter  Subjective:      Patient ID: Michael Shields is a 50 y o  female  Patient has return of pain in her right foot and ankle  No history of trauma  She has pain with ambulation        The following portions of the patient's history were reviewed and updated as appropriate: allergies, current medications, past family history, past medical history, past social history, past surgical history and problem list     Review of Systems      Objective: Foot ExamPhysical Exam    Review of Systems      ROS reviewed  Constitutional: no fever,-- no recent weight gain-- and-- no recent weight loss  Eyes: no double vision-- and-- no blurry vision  Cardiovascular: no chest pain,-- no palpitations-- and-- no lower extremity edema  Respiratory: no complaints of shortness of breath-- and-- no wheezing  Musculoskeletal: no difficulty walking,-- no muscle weakness,-- no joint stiffness,-- no joint swelling,-- no limb swelling`,-- no pain in extremity-- and-- no decreased range of motion  Neurological: no dizziness,-- no difficulty swallowing,-- no memory loss,-- no loss of consciousness-- and-- no seizures  Gastrointestinal: no nausea,-- no vomiting,-- no constipation-- and-- no diarrhea  Genitourinary: no difficulty initiating urine stream,-- no genital pain-- and-- no frequent urination  Integumentary: no complaints of skin rash  Psychiatric: no depression        Endocrine: no excessive thirst,-- no adrenal disease,-- no hypothyroidism-- and-- no hyperthyroidism  Hematologic/Lymphatic: no tendency for easy bruising-- and-- no tendency for easy bleeding  Active Problems   1  Acquired ankle/foot deformity (736 70) (M21 969)   2  Alopecia areata (704 01) (L63 9)   3  Back pain (724 5) (M54 9)   4  Bilateral sciatica (724 3) (M54 31,M54 32)   5  Bony exostosis (726 91) (M89 8X9)   6  Chronic low back pain (724 2,338 29) (M54 5,G89 29)   7  Chronic pain syndrome (338 4) (G89 4)   8  Esophageal reflux (530 81) (K21 9)   9  Influenza vaccination declined (V64 06) (Z28 21)   10  Lumbar radiculopathy (724 4) (M54 16)   11  Migraine (346 90) (G43 909)   12  Pes planus, congenital (754 61) (Q66 50)   13  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)   14  Right ankle pain (719 47) (M25 571)   15  Subconjunctival hemorrhage of left eye (372 72) (H11 32)   16   Thoracic spine pain (724 1) (M54 6)     Past Medical History    · History of Acute back pain (724 5) (M54 9)   · History of Difficulty walking (719 7) (R26 2)   · History of acute pharyngitis (V12 69) (Z87 09)   · History of allergic rhinitis (V12 69) (Z87 09)   · History of conjunctivitis (V12 49) (Z86 69)   · History of contact dermatitis (V13 3) (Z87 2)   · History of screening mammography (V15 89) (Z92 89)   · History of vaginal bleeding (V13 29) (Z87 42)   · History of vaginal discharge (V13 29) (Z87 42)   · History of Left lumbar radiculopathy (724 4) (M54 16)   · History of Left shoulder pain (719 41) (M25 512)   · History of Leg pain (729 5) (M79 606)   · History of Normal breast exam   · History of Pelvic pain in female (625 9) (R10 2)   · History of Screening for depression (V79 0) (Z13 89)   · History of Skin rash (782 1) (R21)   · History of Trace mitral regurgitation by prior echocardiogram (424 0) (I34 0)   · History of Trace tricuspid regurgitation by prior echocardiogram (397 0) (I07 1)   · History of Vaginitis due to Candida (112 1) (B37 3)     The active problems and past medical history were reviewed and updated today  Surgical History    · History of Hysterectomy     The surgical history was reviewed and updated today  Family History   Aunt    · Family history of leukemia (V16 6) (Z80 6)  Uncle    · Family history of malignant melanoma of skin (V16 8) (Z80 8)  Family History    · Family history of heart disease (V17 49) (Z82 49)   · Family history of hypertension (V17 49) (Z82 49)   · Family history of thyroid disease (V18 19) (Z83 49)     The family history was reviewed and updated today  Social History    ·    · Never a smoker   · Occasional alcohol use  The social history was reviewed and updated today  The social history was reviewed and is unchanged  Current Meds    1  Daily Value Multivitamin Oral Tablet; Take 1 tablet daily Recorded   2  Ibuprofen 600 MG Oral Tablet; TAKE 1 TABLET Every 6 hours PRN pain After Meals; Therapy: [id number] to (Evaluate:21Apr2017)  Requested for: 37MEL9701; Last     Rx:07Mar2017 Ordered     The medication list was reviewed and updated today  Allergies   1  Penicillins  2  No Known Food Allergies   3  No Known Latex Allergies     Vitals     Recorded: 04NJV9314 03:52PM   Heart Rate 80   Respiration 17   Systolic 931   Diastolic 72   Height 5 ft 3 in   Weight 118 lb    BMI Calculated 20 9   BSA Calculated 1 55      Physical Exam   Left Foot: Appearance: Normal except as noted: excessive pronation-- and-- pes planus  Tenderness: None  Right Foot: Appearance: Normal except as noted: excessive pronation-- and-- pes planus  Tenderness: None except the lateral hindfoot,-- lateral midfoot-- and-- peroneal tendon  Left Ankle: ROM: Full  Motor: Normal     Right Ankle: Tenderness: None except the peroneal tendons-- and-- posterolateral region  ROM: Full  Motor: Normal     Neurological Exam: performed  Light touch was intact bilaterally  Vibratory sensation was intact bilaterally  Response to monofilament test was intact bilaterally  Deep tendon reflexes: patellar reflex present bilaterally-- and-- achilles reflex present bilaterally  Vascular Exam: performed Dorsalis pedis pulses were present bilaterally  Posterior tibial pulses were present bilaterally  Elevation Pallor: absent bilaterally  Dependence rubor was absent bilaterally  Edema: none  Toenails: All of the toenails were elongated-- and-- hypertrophied  Hyperkeratosis: present on both first toes  Shoe Gear Evaluation: performed ()  Recommendation(s): custom inlays  Results/Data   I personally reviewed the films/images/results in the office today  My interpretation follows  X-ray Review Mild rearfoot osteoarthritis noted  No evidence of fracture or bony mass

## 2018-03-19 ENCOUNTER — OFFICE VISIT (OUTPATIENT)
Dept: FAMILY MEDICINE CLINIC | Facility: CLINIC | Age: 53
End: 2018-03-19
Payer: COMMERCIAL

## 2018-03-19 VITALS
HEIGHT: 63 IN | BODY MASS INDEX: 21.97 KG/M2 | OXYGEN SATURATION: 100 % | TEMPERATURE: 97.7 F | HEART RATE: 89 BPM | SYSTOLIC BLOOD PRESSURE: 120 MMHG | WEIGHT: 124 LBS | DIASTOLIC BLOOD PRESSURE: 80 MMHG

## 2018-03-19 DIAGNOSIS — R31.9 URINARY TRACT INFECTION WITH HEMATURIA, SITE UNSPECIFIED: Primary | ICD-10-CM

## 2018-03-19 DIAGNOSIS — N39.0 URINARY TRACT INFECTION WITH HEMATURIA, SITE UNSPECIFIED: Primary | ICD-10-CM

## 2018-03-19 LAB
SL AMB  POCT GLUCOSE, UA: ABNORMAL
SL AMB LEUKOCYTE ESTERASE,UA: 500
SL AMB POCT BILIRUBIN,UA: ABNORMAL
SL AMB POCT BLOOD,UA: 50
SL AMB POCT CLARITY,UA: ABNORMAL
SL AMB POCT COLOR,UA: CLEAR
SL AMB POCT KETONES,UA: ABNORMAL
SL AMB POCT NITRITE,UA: ABNORMAL
SL AMB POCT PH,UA: 6
SL AMB POCT SPECIFIC GRAVITY,UA: 1.01
SL AMB POCT URINE PROTEIN: ABNORMAL
SL AMB POCT UROBILINOGEN: ABNORMAL

## 2018-03-19 PROCEDURE — 99213 OFFICE O/P EST LOW 20 MIN: CPT | Performed by: FAMILY MEDICINE

## 2018-03-19 PROCEDURE — 81002 URINALYSIS NONAUTO W/O SCOPE: CPT | Performed by: FAMILY MEDICINE

## 2018-03-19 RX ORDER — CIPROFLOXACIN 500 MG/1
500 TABLET, FILM COATED ORAL EVERY 12 HOURS SCHEDULED
Qty: 14 TABLET | Refills: 0 | Status: SHIPPED | OUTPATIENT
Start: 2018-03-19 | End: 2018-03-26

## 2018-03-19 NOTE — PROGRESS NOTES
Assessment/Plan:    Urinary tract infection with hematuria:  Increased frequency and dysuria x9 days  Tried AZO with mild improvement  Urine dip in office today shows positive blood and leukocytes, but negative nitrites  Urine sent for culture, patient is symptoms likely due to UTI  Will treat with ciprofloxacin 500 mg b i d  for 1 week  Will follow up urine culture for sensitivity  Diagnoses and all orders for this visit:    Urinary tract infection with hematuria, site unspecified  -     POCT urine dip  -     Urine culture  -     ciprofloxacin (CIPRO) 500 mg tablet; Take 1 tablet (500 mg total) by mouth every 12 (twelve) hours for 7 days          Subjective:      Patient ID: Rissa Bloom is a 50 y o  female  Patient is a 68-year-old female here for UTI symptoms x9 days  According to patient, she noticed burning during urination with increased frequency  She also noted some hematuria  Patient has tried over-the-counter AZO, which she reports has improved her hematuria, however she still has burning  Denies any fever,flank/ pelvic pain or vaginal discharge  The following portions of the patient's history were reviewed and updated as appropriate: allergies, current medications, past family history, past medical history, past social history, past surgical history and problem list     Review of Systems   Constitutional: Negative for chills and fever  Gastrointestinal: Negative for nausea and vomiting  Genitourinary: Positive for dysuria, frequency and hematuria  Negative for genital sores, pelvic pain and vaginal discharge  Neurological: Negative for headaches  Objective:      /80   Pulse 89   Temp 97 7 °F (36 5 °C) (Tympanic)   Ht 5' 3" (1 6 m)   Wt 56 2 kg (124 lb)   SpO2 100%   BMI 21 97 kg/m²          Physical Exam   Constitutional: She is oriented to person, place, and time  She appears well-developed and well-nourished  No distress     HENT:   Head: Normocephalic and atraumatic  Nose: Nose normal    Mouth/Throat: Oropharynx is clear and moist    Eyes: Conjunctivae and EOM are normal  Pupils are equal, round, and reactive to light  Neck: Normal range of motion  Neck supple  Cardiovascular: Normal rate, regular rhythm and normal heart sounds  Pulmonary/Chest: Effort normal and breath sounds normal  No respiratory distress  She has no wheezes  She has no rales  She exhibits no tenderness  Abdominal: Soft  Bowel sounds are normal  She exhibits no distension and no mass  There is no tenderness  There is no rebound and no guarding  Musculoskeletal: Normal range of motion  She exhibits no edema, tenderness or deformity  Neurological: She is alert and oriented to person, place, and time  She has normal reflexes  Skin: Skin is warm and dry  No rash noted  No erythema  No pallor  Psychiatric: She has a normal mood and affect  Nursing note and vitals reviewed

## 2018-03-22 LAB
BACTERIA UR CULT: ABNORMAL
Lab: ABNORMAL
SL AMB ANTIMICROBIAL SUSCEPTIBILITY: ABNORMAL

## 2018-04-25 ENCOUNTER — OFFICE VISIT (OUTPATIENT)
Dept: FAMILY MEDICINE CLINIC | Facility: CLINIC | Age: 53
End: 2018-04-25
Payer: COMMERCIAL

## 2018-04-25 VITALS
HEIGHT: 63 IN | BODY MASS INDEX: 21.44 KG/M2 | WEIGHT: 121 LBS | SYSTOLIC BLOOD PRESSURE: 120 MMHG | RESPIRATION RATE: 16 BRPM | DIASTOLIC BLOOD PRESSURE: 62 MMHG | HEART RATE: 83 BPM | OXYGEN SATURATION: 100 %

## 2018-04-25 DIAGNOSIS — R23.8 PIMPLES: Primary | ICD-10-CM

## 2018-04-25 PROCEDURE — 99213 OFFICE O/P EST LOW 20 MIN: CPT | Performed by: FAMILY MEDICINE

## 2018-04-25 PROCEDURE — 3008F BODY MASS INDEX DOCD: CPT | Performed by: FAMILY MEDICINE

## 2018-04-25 NOTE — PROGRESS NOTES
Assessment/Plan:    Pimples  Apply hydrocortisone b i d  to lesion  Patient to RTC in 3 days to monitor progression of lesion  Given patient's 27 year plus history of this recurrent pimple, if pimple noted to have progressed in 3 days, will refer to dermatology for further workup           Subjective:      Patient ID: Pj Bose is a 50 y o  female  HPI   This is a 61-year-old female with a history of chronic pain syndrome who presents with a 27-year history of a recurrent pimple to her right upper neck  Patient states she was seen here earlier last year and was worked up for possible zoster lesion which turned out to be negative  She was advised in the future to present early once the pimple re-emerged  Akshat Gillis states 2 days ago, she noticed the presence of the pimple yet again in the same location in the right upper lateral neck  Lesion is pruritic but painless  The patient denies any history of trauma, insect bites or wearing of jewelry around the affected area  Nor has she used any new body lotions, soaps or detergents  The pimple occurs sometimes one-twice annually and its trigger is unknown  Review of Systems   Constitutional: Negative for chills and fever  HENT: Negative for congestion, ear pain, rhinorrhea and sore throat  Eyes: Negative for discharge  Respiratory: Negative for cough, chest tightness, shortness of breath, wheezing and stridor  Cardiovascular: Negative for chest pain, palpitations and leg swelling  Gastrointestinal: Negative for abdominal pain, constipation, diarrhea, nausea and vomiting  Genitourinary: Negative for dysuria  Skin: Negative for color change, pallor, rash and wound  Pimple to right neck   Neurological: Negative for dizziness           Objective:      /62 (BP Location: Left arm, Patient Position: Sitting)   Pulse 83   Resp 16   Ht 5' 3" (1 6 m)   Wt 54 9 kg (121 lb)   SpO2 100%   BMI 21 43 kg/m²        Physical Exam Constitutional: She appears well-developed and well-nourished  HENT:   Head: Normocephalic and atraumatic  Eyes: Right eye exhibits no discharge  Left eye exhibits no discharge  No scleral icterus  Neck: Normal range of motion  Neck supple  No tracheal deviation present  No thyromegaly present  Cardiovascular: Normal rate and normal heart sounds  No murmur heard  Pulmonary/Chest: Effort normal and breath sounds normal    Abdominal: Soft  There is no tenderness  Lymphadenopathy:     She has no cervical adenopathy  Skin: Skin is warm  No rash noted  No erythema  No pallor  0 5x0 5 cm pimple on the right upper lateral neck, No erythema, nontender, No warmth or open wound

## 2018-04-25 NOTE — ASSESSMENT & PLAN NOTE
Apply hydrocortisone b i d  to lesion  Patient to RTC in 3 days to monitor progression of lesion    Given patient's 27 year plus history of this recurrent pimple, if pimple noted to have progressed in 3 days, will refer to dermatology for further workup

## 2018-04-27 ENCOUNTER — OFFICE VISIT (OUTPATIENT)
Dept: FAMILY MEDICINE CLINIC | Facility: CLINIC | Age: 53
End: 2018-04-27
Payer: COMMERCIAL

## 2018-04-27 VITALS
DIASTOLIC BLOOD PRESSURE: 66 MMHG | TEMPERATURE: 97.8 F | WEIGHT: 119 LBS | RESPIRATION RATE: 16 BRPM | BODY MASS INDEX: 21.08 KG/M2 | SYSTOLIC BLOOD PRESSURE: 102 MMHG | HEART RATE: 76 BPM | OXYGEN SATURATION: 99 %

## 2018-04-27 DIAGNOSIS — L98.9 SKIN LESION: Primary | ICD-10-CM

## 2018-04-27 PROCEDURE — 3725F SCREEN DEPRESSION PERFORMED: CPT | Performed by: FAMILY MEDICINE

## 2018-04-27 PROCEDURE — 99213 OFFICE O/P EST LOW 20 MIN: CPT | Performed by: FAMILY MEDICINE

## 2018-04-27 NOTE — PROGRESS NOTES
Assessment/Plan:    Skin lesion  Requisition for referral to dermatology given to patient  D/W Dr Bradley Leong        Subjective:      Patient ID: Anna Garzon is a 50 y o  female  HPI  This is a 65-year-old female who presents with a 20+ year history of recurrent pimples to her right upper neck  Patient was seen earlier this week after she was advised to present to [facility] at the beginning stages of the lesions  She is here today for assessment of progression of lesions  She denies any pruritus, tenderness or erythema today  Lesions have increased in size and currently measure 0 4 x 0 4 cm  She has not had prior workup for possible zoster infection which was negative in the past  She is very concerned that the patient's recurrent despite use of topical steroid and antifungals in the past     Review of Systems   Constitutional: Negative for chills and fever  HENT: Negative for congestion, ear pain, rhinorrhea and sore throat  Eyes: Negative for discharge  Respiratory: Negative for cough, chest tightness, shortness of breath, wheezing and stridor  Cardiovascular: Negative for chest pain, palpitations and leg swelling  Gastrointestinal: Negative for abdominal pain, constipation, diarrhea, nausea and vomiting  Genitourinary: Negative for dysuria  Skin: Positive for rash  Negative for color change, pallor and wound  Neurological: Negative for dizziness  Objective:      /66   Pulse 76   Temp 97 8 °F (36 6 °C)   Resp 16   Wt 54 kg (119 lb)   SpO2 99%   BMI 21 08 kg/m²          Physical Exam   Constitutional: She is oriented to person, place, and time  She appears well-developed and well-nourished  No distress  HENT:   Head: Normocephalic and atraumatic  Right Ear: External ear normal    Left Ear: External ear normal    Nose: Nose normal    Mouth/Throat: Oropharynx is clear and moist  No oropharyngeal exudate  Eyes: Conjunctivae are normal  Right eye exhibits no discharge   Left eye exhibits no discharge  No scleral icterus  Neck: Normal range of motion  Neck supple  No JVD present  Cardiovascular: Normal rate, regular rhythm, normal heart sounds and intact distal pulses  Exam reveals no gallop and no friction rub  No murmur heard  Pulmonary/Chest: Effort normal and breath sounds normal  No stridor  No respiratory distress  She has no wheezes  She has no rales  She exhibits no tenderness  Abdominal: She exhibits no distension and no mass  There is no tenderness  There is no rebound and no guarding  Musculoskeletal: Normal range of motion  She exhibits no edema, tenderness or deformity  Neurological: She is alert and oriented to person, place, and time  She has normal reflexes  No cranial nerve deficit  Skin: Skin is warm  No rash noted  She is not diaphoretic  No erythema  No pallor  Two  0 4cm pimples on Rt upper neck  No erythema, warmth, tenderness   Psychiatric: She has a normal mood and affect

## 2018-09-10 ENCOUNTER — TRANSCRIBE ORDERS (OUTPATIENT)
Dept: ADMINISTRATIVE | Facility: HOSPITAL | Age: 53
End: 2018-09-10

## 2018-09-10 DIAGNOSIS — Z12.39 SCREENING BREAST EXAMINATION: Primary | ICD-10-CM

## 2018-09-10 DIAGNOSIS — Z12.31 VISIT FOR SCREENING MAMMOGRAM: Primary | ICD-10-CM

## 2018-09-27 ENCOUNTER — HOSPITAL ENCOUNTER (OUTPATIENT)
Dept: RADIOLOGY | Facility: HOSPITAL | Age: 53
Discharge: HOME | End: 2018-09-27
Attending: OBSTETRICS & GYNECOLOGY
Payer: COMMERCIAL

## 2018-09-27 DIAGNOSIS — Z12.39 SCREENING BREAST EXAMINATION: ICD-10-CM

## 2018-09-27 PROCEDURE — 77067 SCR MAMMO BI INCL CAD: CPT

## 2018-09-27 PROCEDURE — 77063 BREAST TOMOSYNTHESIS BI: CPT

## 2018-10-05 ENCOUNTER — OFFICE VISIT (OUTPATIENT)
Dept: FAMILY MEDICINE CLINIC | Facility: CLINIC | Age: 53
End: 2018-10-05
Payer: COMMERCIAL

## 2018-10-05 VITALS
BODY MASS INDEX: 21.43 KG/M2 | SYSTOLIC BLOOD PRESSURE: 128 MMHG | HEART RATE: 54 BPM | RESPIRATION RATE: 16 BRPM | TEMPERATURE: 98.3 F | WEIGHT: 121 LBS | OXYGEN SATURATION: 98 % | DIASTOLIC BLOOD PRESSURE: 88 MMHG

## 2018-10-05 DIAGNOSIS — Z12.11 SCREEN FOR COLON CANCER: ICD-10-CM

## 2018-10-05 DIAGNOSIS — Z23 NEED FOR TETANUS, DIPHTHERIA, AND ACELLULAR PERTUSSIS (TDAP) VACCINE: Primary | ICD-10-CM

## 2018-10-05 DIAGNOSIS — L30.9 DERMATITIS: ICD-10-CM

## 2018-10-05 PROBLEM — M54.16 LUMBAR RADICULOPATHY: Status: ACTIVE | Noted: 2017-11-27

## 2018-10-05 PROBLEM — G43.909 MIGRAINE: Status: ACTIVE | Noted: 2017-03-07

## 2018-10-05 PROCEDURE — 90471 IMMUNIZATION ADMIN: CPT

## 2018-10-05 PROCEDURE — 99213 OFFICE O/P EST LOW 20 MIN: CPT | Performed by: FAMILY MEDICINE

## 2018-10-05 PROCEDURE — 90715 TDAP VACCINE 7 YRS/> IM: CPT

## 2018-10-05 NOTE — PROGRESS NOTES
SUBJECTIVE:  10/5/2018 Rosenda Sepulveda MD  I have identified this patient to be Fox Nieto,  -1965, MR# [id number]    Chief complaint: L ring finger dull red for a day or two  NO pain or itching, no trauma    History of Present Illness:    Patient Active Problem List   Diagnosis    Chronic pain disorder    Low back pain    Intervertebral disc disorder with radiculopathy of lumbosacral region    Acquired deformity of right foot    Arthralgia of right foot    Peroneal tendinitis of right lower extremity    Pimples    BMI 21 0-21 9, adult    Skin lesion    Alopecia areata    Esophageal reflux    Lumbar radiculopathy    Migraine    Pes planus, congenital    Psoriasiform dermatitis       EXAM:  /88   Pulse (!) 54   Temp 98 3 °F (36 8 °C)   Resp 16   Wt 54 9 kg (121 lb)   SpO2 98%   BMI 21 43 kg/m²   The patient appears well, in no apparent distress  Alert and oriented times three, pleasant and cooperative  Vital signs are as noted by the nurse      Ring finger has a dull red non tender area on dorsum of middle phalanx  Finger function intact  Skin: good capillary refill,no rashes noted  Extremities: Good power and tone all extremities bilaterally  No pedal edema      ASSESSMENT/PLAN:  1  Need for tetanus, diphtheria, and acellular pertussis (Tdap) vaccine    - TDAP VACCINE GREATER THAN OR EQUAL TO 6YO IM    2  Screen for colon cancer    - Cologuard    3  Dermatitis  Finger is likely benign   Benadryl cream, report worsening

## 2018-12-13 ENCOUNTER — OFFICE VISIT (OUTPATIENT)
Dept: FAMILY MEDICINE CLINIC | Facility: CLINIC | Age: 53
End: 2018-12-13
Payer: COMMERCIAL

## 2018-12-13 VITALS
OXYGEN SATURATION: 100 % | HEART RATE: 80 BPM | SYSTOLIC BLOOD PRESSURE: 108 MMHG | BODY MASS INDEX: 21.61 KG/M2 | DIASTOLIC BLOOD PRESSURE: 64 MMHG | WEIGHT: 122 LBS | RESPIRATION RATE: 16 BRPM

## 2018-12-13 DIAGNOSIS — Z28.21 INFLUENZA VACCINATION DECLINED: ICD-10-CM

## 2018-12-13 DIAGNOSIS — G43.919 INTRACTABLE MIGRAINE WITHOUT STATUS MIGRAINOSUS, UNSPECIFIED MIGRAINE TYPE: Primary | ICD-10-CM

## 2018-12-13 PROCEDURE — 99213 OFFICE O/P EST LOW 20 MIN: CPT | Performed by: FAMILY MEDICINE

## 2018-12-13 RX ORDER — SUMATRIPTAN 50 MG/1
50 TABLET, FILM COATED ORAL ONCE AS NEEDED
Qty: 15 TABLET | Refills: 6 | Status: SHIPPED | OUTPATIENT
Start: 2018-12-13 | End: 2020-01-07

## 2018-12-14 NOTE — PROGRESS NOTES
Assessment/Plan:    No problem-specific Assessment & Plan notes found for this encounter  Diagnoses and all orders for this visit:    Intractable migraine without status migrainosus, unspecified migraine type  Comments:  advise to combine Imitrex and Ibuprofen, regine have a headache diary, follow up in 1 month  Orders:  -     SUMAtriptan (IMITREX) 50 mg tablet; Take 1 tablet (50 mg total) by mouth once as needed for migraine for up to 1 dose    Influenza vaccination declined  -     PREFERRED: influenza vaccine, [id number], quadrivalent, recombinant, PF, 0 5 mL, for patients 18 yr+ (FLUBLOK)          Discussed with the patient and all questioned fully answered  She will call me if any problems arise  Subjective:      Patient ID: Dot Sorto is a 48 y o  female  Chief Complaint   Patient presents with    Follow-up     test f/u    Elbow Pain     x 1 week       45-year-old female comes in complaining of migraine, patient stated she has migraine history since she was a teenager, her headache has been easily resolved with ibuprofen, however lately ibuprofen does not seems to do anything with her headache, she complained of photophobia during headache and feeling nauseous  She stated her mother sent her some migraine medication from her country which seems to help however she run out of it and she does not know what kind of migraine medication was that, she decided to come in for further evaluation  patient stated she was not quite sure how many headache she got in a month, reporting sometimes she will get it 2 to 3 times a week and sometimes 2 to 3 times a month          The following portions of the patient's history were reviewed and updated as appropriate: allergies, current medications, past family history, past medical history, past social history, past surgical history and problem list       Review of Systems   Constitutional: Negative for activity change, appetite change, fatigue and unexpected weight change  Musculoskeletal: Negative for arthralgias, back pain, gait problem and joint swelling  Neurological: Negative for dizziness and facial asymmetry  (+) Headache  Psychiatric/Behavioral: Negative for agitation, behavioral problems, confusion and decreased concentration  Objective:    /64   Pulse 80   Resp 16   Wt 55 3 kg (122 lb)   SpO2 100%   BMI 21 61 kg/m²       Physical Exam   Constitutional:  oriented to person, place, and time  well-developed and well-nourished  No distress  Cardiovascular: Normal rate, regular rhythm, normal heart sounds and intact distal pulses  Exam reveals no gallop and no friction rub  No murmur heard  Pulmonary/Chest: Effort normal and breath sounds normal  No respiratory distress  no wheezes  no rales  no tenderness  Abdominal: Soft  Bowel sounds are normal  no distension  There is no tenderness  There is no rebound and no guarding  Neurological:  alert and oriented to person, place, and time  normal reflexes  Psychiatric: normal mood and affect  behavior is normal  Judgment and thought content normal

## 2019-03-14 ENCOUNTER — OFFICE VISIT (OUTPATIENT)
Dept: FAMILY MEDICINE CLINIC | Facility: CLINIC | Age: 54
End: 2019-03-14
Payer: COMMERCIAL

## 2019-03-14 VITALS
OXYGEN SATURATION: 100 % | DIASTOLIC BLOOD PRESSURE: 78 MMHG | BODY MASS INDEX: 22.14 KG/M2 | SYSTOLIC BLOOD PRESSURE: 122 MMHG | HEART RATE: 92 BPM | RESPIRATION RATE: 16 BRPM | WEIGHT: 125 LBS

## 2019-03-14 DIAGNOSIS — E78.5 HYPERLIPIDEMIA, UNSPECIFIED HYPERLIPIDEMIA TYPE: Primary | ICD-10-CM

## 2019-03-14 DIAGNOSIS — R74.8 ELEVATED SERUM GGT LEVEL: ICD-10-CM

## 2019-03-14 PROCEDURE — 99213 OFFICE O/P EST LOW 20 MIN: CPT | Performed by: FAMILY MEDICINE

## 2019-03-14 RX ORDER — ATORVASTATIN CALCIUM 20 MG/1
20 TABLET, FILM COATED ORAL DAILY
Qty: 90 TABLET | Refills: 3 | Status: SHIPPED | OUTPATIENT
Start: 2019-03-14 | End: 2019-09-17

## 2019-03-15 NOTE — PROGRESS NOTES
Assessment/Plan:    No problem-specific Assessment & Plan notes found for this encounter  Diagnoses and all orders for this visit:    Hyperlipidemia, unspecified hyperlipidemia type  -     atorvastatin (LIPITOR) 20 mg tablet; Take 1 tablet (20 mg total) by mouth daily  -     Comprehensive metabolic panel    Elevated serum GGT level  -     US abdomen limited; Future  -     Ambulatory referral to Gastroenterology; Future  -     Comprehensive metabolic panel  -     Gamma GT; Future  -     Gamma GT          Discussed with the patient and all questioned fully answered  She will call me if any problems arise  Subjective:      Patient ID: Sejal Sanchez is a 48 y o  female  Chief Complaint   Patient presents with    Follow-up       48year old female comes in for abnormal labs, apparently she wanted to have life insurance, had blood work with them, noted to have elevation of GGT and LDL, she is completely asymptomatic, pt is very worry, do not have cancer history in the family, no family history of CAD      The following portions of the patient's history were reviewed and updated as appropriate: allergies, current medications, past family history, past medical history, past social history, past surgical history and problem list       Review of Systems   Constitutional: Negative for activity change, appetite change, fatigue and unexpected weight change  Cardiovascular: Negative for chest pain, palpitations and leg swelling  Gastrointestinal: Negative for abdominal distention, abdominal pain, anal bleeding, blood in stool and constipation  Neurological: Negative for dizziness and facial asymmetry  Psychiatric/Behavioral: Negative for agitation, behavioral problems, confusion and decreased concentration       Past Medical History:   Diagnosis Date    Allergic rhinitis     History of screening mammography     last assessed: 10/28/2013    Left lumbar radiculopathy     last assessed: 08/02/2017   Yamileth Capps Mitral valve regurgitation     trace, by prior echocardiogram    Tricuspid regurgitation     trace, by prior echocardiogram    Vagina bleeding     last assessed: 12/08/2014     Past Surgical History:   Procedure Laterality Date    EPIDURAL BLOCK INJECTION Right 1/18/2018    Procedure: TRANSFORAMINAL L5-S1;  Surgeon: Polina Case MD;  Location: [facility] MAIN OR;  Service: Pain Management     Ingrid Kowalski had no medications administered during this visit    Allergies   Allergen Reactions    Penicillins      Social History     Socioeconomic History    Marital status: /Civil Union     Spouse name: Not on file    Number of children: Not on file    Years of education: Not on file    Highest education level: Not on file   Occupational History    Not on file   Social Needs    Financial resource strain: Not on file    Food insecurity:     Worry: Not on file     Inability: Not on file    Transportation needs:     Medical: Not on file     Non-medical: Not on file   Tobacco Use    Smoking status: Never Smoker    Smokeless tobacco: Never Used   Substance and Sexual Activity    Alcohol use: No     Comment: (Occasional alcohol use-as per [employer])    Drug use: No    Sexual activity: Not on file   Lifestyle    Physical activity:     Days per week: Not on file     Minutes per session: Not on file    Stress: Not on file   Relationships    Social connections:     Talks on phone: Not on file     Gets together: Not on file     Attends Jew service: Not on file     Active member of club or organization: Not on file     Attends meetings of clubs or organizations: Not on file     Relationship status: Not on file    Intimate partner violence:     Fear of current or ex partner: Not on file     Emotionally abused: Not on file     Physically abused: Not on file     Forced sexual activity: Not on file   Other Topics Concern    Not on file   Social History Narrative    Not on file Objective:    /78   Pulse 92   Resp 16   Wt 56 7 kg (125 lb)   SpO2 100%   BMI 22 14 kg/m²       Physical Exam   Constitutional:  oriented to person, place, and time  well-developed and well-nourished  No distress  Cardiovascular: Normal rate, regular rhythm, normal heart sounds and intact distal pulses  Exam reveals no gallop and no friction rub  No murmur heard  Pulmonary/Chest: Effort normal and breath sounds normal  No respiratory distress  no wheezes  no rales  s no tenderness  Abdominal: Soft  Bowel sounds are normal  no distension  There is no tenderness  There is no rebound and no guarding  Psychiatric: normal mood and affect  behavior is normal  Judgment and thought content normal

## 2019-03-28 ENCOUNTER — OFFICE VISIT (OUTPATIENT)
Dept: FAMILY MEDICINE CLINIC | Facility: CLINIC | Age: 54
End: 2019-03-28
Payer: COMMERCIAL

## 2019-03-28 VITALS
BODY MASS INDEX: 21.61 KG/M2 | RESPIRATION RATE: 18 BRPM | HEART RATE: 90 BPM | WEIGHT: 122 LBS | TEMPERATURE: 98.1 F | OXYGEN SATURATION: 98 % | SYSTOLIC BLOOD PRESSURE: 104 MMHG | DIASTOLIC BLOOD PRESSURE: 68 MMHG

## 2019-03-28 DIAGNOSIS — M25.562 PAIN IN BOTH KNEES, UNSPECIFIED CHRONICITY: ICD-10-CM

## 2019-03-28 DIAGNOSIS — G89.29 ELBOW PAIN, CHRONIC, UNSPECIFIED LATERALITY: Primary | ICD-10-CM

## 2019-03-28 DIAGNOSIS — M25.529 ELBOW PAIN, CHRONIC, UNSPECIFIED LATERALITY: Primary | ICD-10-CM

## 2019-03-28 DIAGNOSIS — M25.561 PAIN IN BOTH KNEES, UNSPECIFIED CHRONICITY: ICD-10-CM

## 2019-03-28 PROCEDURE — 99213 OFFICE O/P EST LOW 20 MIN: CPT | Performed by: FAMILY MEDICINE

## 2019-03-29 ENCOUNTER — TELEPHONE (OUTPATIENT)
Dept: FAMILY MEDICINE CLINIC | Facility: CLINIC | Age: 54
End: 2019-03-29

## 2019-03-29 NOTE — PROGRESS NOTES
Assessment/Plan:    No problem-specific Assessment & Plan notes found for this encounter  Diagnoses and all orders for this visit:    Elbow pain, chronic, unspecified laterality  -     Ambulatory referral to Orthopedic Surgery; Future    Pain in both knees, unspecified chronicity  -     Ambulatory referral to Orthopedic Surgery; Future          Discussed with the patient and all questioned fully answered  She will call me if any problems arise  Subjective:      Patient ID: Yolanda Maciel is a 48 y o  female  Chief Complaint   Patient presents with    Elbow Injury     bones hurt       68-year-old female comes in complaining of left elbow pain, she stated she has pain for the past month, she is doing cleaning job, has a lot of arm movement, also complaining of bilateral  knee pain and crepitus sound, since she bent a lot, she stated that she had seen Dr Naz Solorio in the past, would like to see Dr Naz Solorio again      The following portions of the patient's history were reviewed and updated as appropriate: allergies, current medications, past family history, past medical history, past social history, past surgical history and problem list       Review of Systems   Constitutional: Negative for activity change, appetite change, fatigue and unexpected weight change  Musculoskeletal: Negative for back pain, gait problem and joint swelling  (+) Elbow pain, (+) knee pain  Neurological: Negative for dizziness and facial asymmetry  Psychiatric/Behavioral: Negative for agitation, behavioral problems, confusion and decreased concentration  Objective:    /68   Pulse 90   Temp 98 1 °F (36 7 °C)   Resp 18   Wt 55 3 kg (122 lb)   SpO2 98%   BMI 21 61 kg/m²       Physical Exam   Constitutional:  oriented to person, place, and time  well-developed and well-nourished  No distress     Musculoskeletal: Normal range of motion   (+) left Medial epicondyle tenderness, (+) B/L knee tenderness, no effusion  Neurological:  alert and oriented to person, place, and time  normal reflexes  Skin: Skin is warm and dry  Psychiatric: normal mood and affect  behavior is normal  Judgment and thought content normal

## 2019-05-03 ENCOUNTER — OFFICE VISIT (OUTPATIENT)
Dept: OBGYN CLINIC | Facility: CLINIC | Age: 54
End: 2019-05-03
Payer: COMMERCIAL

## 2019-05-03 VITALS
HEIGHT: 63 IN | SYSTOLIC BLOOD PRESSURE: 122 MMHG | DIASTOLIC BLOOD PRESSURE: 78 MMHG | HEART RATE: 66 BPM | WEIGHT: 125 LBS | BODY MASS INDEX: 22.15 KG/M2

## 2019-05-03 DIAGNOSIS — M77.12 LATERAL EPICONDYLITIS OF BOTH ELBOWS: Primary | ICD-10-CM

## 2019-05-03 DIAGNOSIS — M77.11 LATERAL EPICONDYLITIS OF BOTH ELBOWS: Primary | ICD-10-CM

## 2019-05-03 LAB
ALBUMIN SERPL-MCNC: 4.5 G/DL (ref 3.5–5.5)
ALBUMIN/GLOB SERPL: 2.1 {RATIO} (ref 1.2–2.2)
ALP SERPL-CCNC: 45 IU/L (ref 39–117)
ALT SERPL-CCNC: 26 IU/L (ref 0–32)
AST SERPL-CCNC: 20 IU/L (ref 0–40)
BILIRUB SERPL-MCNC: 0.5 MG/DL (ref 0–1.2)
BUN SERPL-MCNC: 14 MG/DL (ref 6–24)
BUN/CREAT SERPL: 19 (ref 9–23)
CALCIUM SERPL-MCNC: 9.3 MG/DL (ref 8.7–10.2)
CHLORIDE SERPL-SCNC: 105 MMOL/L (ref 96–106)
CO2 SERPL-SCNC: 22 MMOL/L (ref 20–29)
CREAT SERPL-MCNC: 0.75 MG/DL (ref 0.57–1)
GGT SERPL-CCNC: 69 IU/L (ref 0–60)
GLOBULIN SER-MCNC: 2.1 G/DL (ref 1.5–4.5)
GLUCOSE SERPL-MCNC: 96 MG/DL (ref 65–99)
POTASSIUM SERPL-SCNC: 3.8 MMOL/L (ref 3.5–5.2)
PROT SERPL-MCNC: 6.6 G/DL (ref 6–8.5)
SL AMB EGFR AFRICAN AMERICAN: 107 ML/MIN/1.73
SL AMB EGFR NON AFRICAN AMERICAN: 93 ML/MIN/1.73
SODIUM SERPL-SCNC: 142 MMOL/L (ref 134–144)

## 2019-05-03 PROCEDURE — 99213 OFFICE O/P EST LOW 20 MIN: CPT | Performed by: ORTHOPAEDIC SURGERY

## 2019-05-03 PROCEDURE — 20551 NJX 1 TENDON ORIGIN/INSJ: CPT | Performed by: ORTHOPAEDIC SURGERY

## 2019-05-03 RX ORDER — IBUPROFEN 600 MG/1
TABLET ORAL
COMMUNITY
Start: 2019-04-08 | End: 2019-05-03 | Stop reason: ALTCHOICE

## 2019-05-03 RX ORDER — LIDOCAINE HYDROCHLORIDE 5 MG/ML
0.5 INJECTION, SOLUTION INFILTRATION; PERINEURAL
Status: COMPLETED | OUTPATIENT
Start: 2019-05-03 | End: 2019-05-03

## 2019-05-03 RX ORDER — TRIAMCINOLONE ACETONIDE 40 MG/ML
20 INJECTION, SUSPENSION INTRA-ARTICULAR; INTRAMUSCULAR
Status: COMPLETED | OUTPATIENT
Start: 2019-05-03 | End: 2019-05-03

## 2019-05-03 RX ADMIN — TRIAMCINOLONE ACETONIDE 20 MG: 40 INJECTION, SUSPENSION INTRA-ARTICULAR; INTRAMUSCULAR at 15:41

## 2019-05-03 RX ADMIN — LIDOCAINE HYDROCHLORIDE 0.5 ML: 5 INJECTION, SOLUTION INFILTRATION; PERINEURAL at 15:41

## 2019-05-30 ENCOUNTER — OFFICE VISIT (OUTPATIENT)
Dept: FAMILY MEDICINE CLINIC | Facility: CLINIC | Age: 54
End: 2019-05-30
Payer: COMMERCIAL

## 2019-05-30 VITALS
HEART RATE: 84 BPM | OXYGEN SATURATION: 100 % | WEIGHT: 125 LBS | DIASTOLIC BLOOD PRESSURE: 68 MMHG | SYSTOLIC BLOOD PRESSURE: 102 MMHG | TEMPERATURE: 98.1 F | BODY MASS INDEX: 22.14 KG/M2 | RESPIRATION RATE: 18 BRPM

## 2019-05-30 DIAGNOSIS — R00.2 PALPITATION: ICD-10-CM

## 2019-05-30 DIAGNOSIS — R74.8 ELEVATED SERUM GGT LEVEL: Primary | ICD-10-CM

## 2019-05-30 PROCEDURE — 99213 OFFICE O/P EST LOW 20 MIN: CPT | Performed by: FAMILY MEDICINE

## 2019-06-11 ENCOUNTER — TELEPHONE (OUTPATIENT)
Dept: CARDIOLOGY CLINIC | Facility: CLINIC | Age: 54
End: 2019-06-11

## 2019-06-14 ENCOUNTER — CONSULT (OUTPATIENT)
Dept: CARDIOLOGY CLINIC | Facility: CLINIC | Age: 54
End: 2019-06-14
Payer: COMMERCIAL

## 2019-06-14 VITALS
SYSTOLIC BLOOD PRESSURE: 100 MMHG | HEART RATE: 75 BPM | BODY MASS INDEX: 21.78 KG/M2 | OXYGEN SATURATION: 99 % | HEIGHT: 63 IN | DIASTOLIC BLOOD PRESSURE: 66 MMHG | WEIGHT: 122.9 LBS

## 2019-06-14 DIAGNOSIS — R00.2 PALPITATION: ICD-10-CM

## 2019-06-14 PROCEDURE — 93000 ELECTROCARDIOGRAM COMPLETE: CPT | Performed by: INTERNAL MEDICINE

## 2019-06-14 PROCEDURE — 99243 OFF/OP CNSLTJ NEW/EST LOW 30: CPT | Performed by: INTERNAL MEDICINE

## 2019-06-14 RX ORDER — RIBOFLAVIN (VITAMIN B2) 100 MG
100 TABLET ORAL DAILY
COMMUNITY

## 2019-06-14 RX ORDER — THIAMINE MONONITRATE (VIT B1) 100 MG
100 TABLET ORAL DAILY
COMMUNITY

## 2019-07-08 ENCOUNTER — OFFICE VISIT (OUTPATIENT)
Dept: OBGYN CLINIC | Facility: CLINIC | Age: 54
End: 2019-07-08
Payer: COMMERCIAL

## 2019-07-08 VITALS
SYSTOLIC BLOOD PRESSURE: 119 MMHG | HEART RATE: 82 BPM | BODY MASS INDEX: 22.15 KG/M2 | DIASTOLIC BLOOD PRESSURE: 75 MMHG | HEIGHT: 63 IN | WEIGHT: 125 LBS

## 2019-07-08 DIAGNOSIS — M77.01 MEDIAL EPICONDYLITIS OF RIGHT ELBOW: Primary | ICD-10-CM

## 2019-07-08 DIAGNOSIS — M77.12 LATERAL EPICONDYLITIS OF BOTH ELBOWS: ICD-10-CM

## 2019-07-08 DIAGNOSIS — M77.11 LATERAL EPICONDYLITIS OF BOTH ELBOWS: ICD-10-CM

## 2019-07-08 PROCEDURE — 99213 OFFICE O/P EST LOW 20 MIN: CPT | Performed by: ORTHOPAEDIC SURGERY

## 2019-07-08 NOTE — PROGRESS NOTES
Assessment/Plan:  1  Medial epicondylitis of right elbow     2  Lateral epicondylitis of both elbows         Scribe Attestation    I,:   Shani Munoz MA am acting as a scribe while in the presence of the attending physician :        I,:   Sunita Vergara, DO personally performed the services described in this documentation    as scribed in my presence :              I discussed with Mervin Monroyttcher today that her signs and symptoms are consistent with medial epicondylitis of the right elbow  She is tender to palpation over the medial epicondyle  She has pain with resisted pronation  Treatment options were discussed in the form of therapy for stretching  She is aware I do not give injections for medial epicondylitis due to the proximity to the ulnar nerve  She was provided with a HEP for stretching as she deferred formal therapy today  She may continue with the use of the cock-up wrist brace  She may take Advil OTC as needed for pain  She may follow up with me as needed  Subjective:   Hemanth Palacios is a 48 y o  female who presents to the office today for follow up evaluation bilateral elbow pain  At her last visit on 5/3/19 she underwent a left elbow injection which she states provided her with pain relief  She denies any pain to the left elbow  Patient states a week ago she noticed a divot to the left aspect of her left elbow  She notes pain to the medial aspect of her right elbow  She states this is increased with certain movements  She denies any numbness or tingling  Review of Systems   Constitutional: Negative for chills and fever  HENT: Negative for drooling and sneezing  Eyes: Negative for redness  Respiratory: Negative for cough and wheezing  Gastrointestinal: Negative for nausea and vomiting  Musculoskeletal: Positive for arthralgias  Negative for joint swelling and myalgias  Neurological: Positive for headaches  Negative for weakness and numbness     Psychiatric/Behavioral: Negative for behavioral problems  The patient is not nervous/anxious            Past Medical History:   Diagnosis Date    Allergic rhinitis     History of screening mammography     last assessed: 10/28/2013    Left lumbar radiculopathy     last assessed: 08/02/2017    Mitral valve regurgitation     trace, by prior echocardiogram    Tricuspid regurgitation     trace, by prior echocardiogram    Vagina bleeding     last assessed: 12/08/2014       Past Surgical History:   Procedure Laterality Date    EPIDURAL BLOCK INJECTION Right 1/18/2018    Procedure: TRANSFORAMINAL L5-S1;  Surgeon: Nancy Hamilton MD;  Location: [facility] MAIN OR;  Service: Pain Management     HYSTERECTOMY         Family History   Problem Relation Age of Onset    Heart disease Family     Hypertension Family     Thyroid disease Family     Leukemia Other     Melanoma Other         malignant, of the skin       Social History     Occupational History    Not on file   Tobacco Use    Smoking status: Never Smoker    Smokeless tobacco: Never Used   Substance and Sexual Activity    Alcohol use: No     Comment: (Occasional alcohol use-as per [employer])    Drug use: No    Sexual activity: Not on file         Current Outpatient Medications:     Ascorbic Acid (VITAMIN C) 100 MG tablet, Take 100 mg by mouth daily, Disp: , Rfl:     atorvastatin (LIPITOR) 20 mg tablet, Take 1 tablet (20 mg total) by mouth daily, Disp: 90 tablet, Rfl: 3    Cholecalciferol 1000 units capsule, Take 1,000 Units by mouth daily, Disp: , Rfl:     multivitamin (THERAGRAN) TABS, Take 1 tablet by mouth daily, Disp: , Rfl:     SUMAtriptan (IMITREX) 50 mg tablet, Take 1 tablet (50 mg total) by mouth once as needed for migraine for up to 1 dose, Disp: 15 tablet, Rfl: 6    thiamine (VITAMIN B1) 100 mg tablet, Take 100 mg by mouth daily, Disp: , Rfl:     Allergies   Allergen Reactions    Penicillins        Objective:  Vitals:    07/08/19 1658   BP: 119/75   Pulse: 82       Ortho Exam Right elbow    TTP medial epicondyle   Pain with resisted pronation  Full ROM  NTTP lateral epicondyle   Compartments soft  Brisk capillary refill  Sensation intact median, radial, and ulnar nerve     Physical Exam   Constitutional: She is oriented to person, place, and time  She appears well-developed and well-nourished  HENT:   Head: Normocephalic and atraumatic  Eyes: Conjunctivae are normal  Right eye exhibits no discharge  Left eye exhibits no discharge  Neck: Normal range of motion  Neck supple  Cardiovascular: Normal rate and intact distal pulses  Pulmonary/Chest: Effort normal  No respiratory distress  Musculoskeletal:   As noted in HPI   Neurological: She is alert and oriented to person, place, and time  Skin: Skin is warm and dry  Psychiatric: She has a normal mood and affect   Her behavior is normal  Judgment and thought content normal

## 2019-07-26 ENCOUNTER — HOSPITAL ENCOUNTER (OUTPATIENT)
Dept: NON INVASIVE DIAGNOSTICS | Facility: HOSPITAL | Age: 54
Discharge: HOME | End: 2019-07-26
Attending: INTERNAL MEDICINE
Payer: COMMERCIAL

## 2019-07-26 DIAGNOSIS — R00.2 PALPITATION: ICD-10-CM

## 2019-07-26 PROCEDURE — 93226 XTRNL ECG REC<48 HR SCAN A/R: CPT

## 2019-07-26 PROCEDURE — 93306 TTE W/DOPPLER COMPLETE: CPT

## 2019-07-26 PROCEDURE — 93225 XTRNL ECG REC<48 HRS REC: CPT

## 2019-07-26 PROCEDURE — 93306 TTE W/DOPPLER COMPLETE: CPT | Performed by: INTERNAL MEDICINE

## 2019-07-29 ENCOUNTER — TELEPHONE (OUTPATIENT)
Dept: CARDIOLOGY CLINIC | Facility: CLINIC | Age: 54
End: 2019-07-29

## 2019-07-29 NOTE — TELEPHONE ENCOUNTER
----- Message from Bucky Almeida DO sent at 7/29/2019 10:52 AM EDT -----  Can you please let the patient know echo was normal

## 2019-07-30 NOTE — TELEPHONE ENCOUNTER
Patient called back and was notified of her echo results  No further questions at this time   Will f/u with reg appts

## 2019-08-07 PROCEDURE — 93227 XTRNL ECG REC<48 HR R&I: CPT | Performed by: INTERNAL MEDICINE

## 2019-08-08 DIAGNOSIS — R00.0 SINUS TACHYCARDIA SEEN ON CARDIAC MONITOR: ICD-10-CM

## 2019-08-08 DIAGNOSIS — I47.1 PAROXYSMAL SVT (SUPRAVENTRICULAR TACHYCARDIA) (HCC): Primary | ICD-10-CM

## 2019-08-12 ENCOUNTER — OFFICE VISIT (OUTPATIENT)
Dept: GASTROENTEROLOGY | Facility: CLINIC | Age: 54
End: 2019-08-12
Payer: COMMERCIAL

## 2019-08-12 VITALS
DIASTOLIC BLOOD PRESSURE: 80 MMHG | TEMPERATURE: 98.9 F | HEART RATE: 80 BPM | BODY MASS INDEX: 22.08 KG/M2 | WEIGHT: 124.6 LBS | HEIGHT: 63 IN | SYSTOLIC BLOOD PRESSURE: 110 MMHG | RESPIRATION RATE: 16 BRPM

## 2019-08-12 DIAGNOSIS — Z12.11 COLON CANCER SCREENING: ICD-10-CM

## 2019-08-12 DIAGNOSIS — R74.8 ELEVATED SERUM GGT LEVEL: Primary | ICD-10-CM

## 2019-08-12 PROCEDURE — 99244 OFF/OP CNSLTJ NEW/EST MOD 40: CPT | Performed by: INTERNAL MEDICINE

## 2019-08-12 RX ORDER — MULTIVITAMIN
1 TABLET ORAL DAILY
COMMUNITY

## 2019-08-12 NOTE — PROGRESS NOTES
Consultation - [facility]  Lorenza Kowalski 48 y o  female MRN: [id number]  Unit/Bed#:  Encounter: [id number]        Consults    ASSESSMENT/PLAN:       1  Isolated elevation of GGT of 69-unclear etiology, appears to be mild, remainder of the LFTs are completely normal   -will check LALA, AMA, anti smooth muscle antibody, iron/TIBC ratio   -will check chronic hepatitis panel   -check CBC  -patient is already scheduled for right upper quadrant ultrasound, will follow this up  2  Colon cancer screening-patient underwent colon were test 1 year ago, would recommend screening colonoscopy instead  No family history of colon cancer  No change in bowel habits or hematochezia  Patient was explained about  the risks and benefits of the procedure  Risks including but not limited to bleeding, infection, perforation were explained in detail  Also explained about less than 100% sensitivity with the exam and other alternatives  ______________________________________________________________________    Reason for Consult / Principal Problem: [unfilled]    HPI: Perfecto Castillo is a 48y o  year old female with history of allergic rhinitis, mitral regurgitation, presents for evaluation of elevation of GGT  Patient recently underwent blood work was and was noted to have mild elevation of GGT at 69  AST is 20, ALT 26, alk-phos isoenzymes are normal   Patient denies history of liver disease  Denies jaundice, peripheral edema or abdominal distention  She denies family history of liver disease  She denies ever having had tattoos  She states that she does not drink alcohol and does not smoke  She denies unintentional weight loss  No recent or herbal supplements  Review of Systems: The remainder of the review of systems was negative except for the pertinent positives noted in HPI       Historical Information   Past Medical History:   Diagnosis Date    Allergic rhinitis     History of screening mammography     last assessed: 10/28/2013    Left lumbar radiculopathy     last assessed: 08/02/2017    Mitral valve regurgitation     trace, by prior echocardiogram    Tricuspid regurgitation     trace, by prior echocardiogram    Vagina bleeding     last assessed: 12/08/2014     Past Surgical History:   Procedure Laterality Date    EPIDURAL BLOCK INJECTION Right 1/18/2018    Procedure: TRANSFORAMINAL L5-S1;  Surgeon: David Duran MD;  Location: [facility] MAIN OR;  Service: Pain Management     HYSTERECTOMY       Social History   Social History     Substance and Sexual Activity   Alcohol Use No    Comment: (Occasional alcohol use-as per [employer])     Social History     Substance and Sexual Activity   Drug Use No     Social History     Tobacco Use   Smoking Status Never Smoker   Smokeless Tobacco Never Used     Family History   Problem Relation Age of Onset    Heart disease Family     Hypertension Family     Thyroid disease Family     Leukemia Other     Melanoma Other         malignant, of the skin       Meds/Allergies       (Not in a hospital admission)  No current facility-administered medications for this visit  Allergies   Allergen Reactions    Penicillins        Objective     Blood pressure 110/80, pulse 80, temperature 98 9 °F (37 2 °C), temperature source Tympanic, resp  rate 16, height 5' 3" (1 6 m), weight 56 5 kg (124 lb 9 6 oz)  [unfilled]    PHYSICAL EXAM     GEN: well nourished, well developed, no acute distress  HEENT: anicteric, MMM, no cervical or supraclavicular lymphadenopathy  CV: RRR, no m/r/g  CHEST: CTA b/l, no WRR  ABD: +BS, soft, NT/ND, no hepatosplenomegaly  EXT: no c/c/e  SKIN: no rashes,  NEURO: aaox3    Lab Results:   No visits with results within 1 Day(s) from this visit     Latest known visit with results is:   Office Visit on 03/14/2019   Component Date Value    Glucose, Random 05/02/2019 96     BUN 05/02/2019 14     Creatinine 05/02/2019 0 75     eGFR Non  05/02/2019 93     eGFR  05/02/2019 107     SL AMB BUN/CREATININE RA* 05/02/2019 19     Sodium 05/02/2019 142     Potassium 05/02/2019 3 8     Chloride 05/02/2019 105     CO2 05/02/2019 22     CALCIUM 05/02/2019 9 3     Protein, Total 05/02/2019 6 6     Albumin 05/02/2019 4 5     Globulin, Total 05/02/2019 2 1     Albumin/Globulin Ratio 05/02/2019 2 1     TOTAL BILIRUBIN 05/02/2019 0 5     Alk Phos Isoenzymes 05/02/2019 45     AST 05/02/2019 20     ALT 05/02/2019 26     GGT 05/02/2019 69*     Imaging Studies: I have personally reviewed pertinent films in PACS

## 2019-08-12 NOTE — LETTER
August 12, 2019     [address]  [address] [facility]    Patient: Raleigh Badillo   YOB: 1965   Date of Visit: 8/12/2019       Dear Dr Gaytan Held:    Thank you for referring Karlene Skiff to me for evaluation  Below are my notes for this consultation  If you have questions, please do not hesitate to call me  I look forward to following your patient along with you  Sincerely,        Sudarshan Weinberg MD        CC: No Recipients  Sudarshan Weinberg MD  8/12/2019  5:29 PM  Sign at close encounter  Consultation - [address] [facility]  Anabella Kowalski 48 y o  female MRN: [id number]  Unit/Bed#:  Encounter: [id number]        Consults    ASSESSMENT/PLAN:       1  Isolated elevation of GGT of 69-unclear etiology, appears to be mild, remainder of the LFTs are completely normal   -will check LALA, AMA, anti smooth muscle antibody, iron/TIBC ratio   -will check chronic hepatitis panel   -check CBC  -patient is already scheduled for right upper quadrant ultrasound, will follow this up  2  Colon cancer screening-patient underwent colon were test 1 year ago, would recommend screening colonoscopy instead  No family history of colon cancer  No change in bowel habits or hematochezia  Patient was explained about  the risks and benefits of the procedure  Risks including but not limited to bleeding, infection, perforation were explained in detail  Also explained about less than 100% sensitivity with the exam and other alternatives  ______________________________________________________________________    Reason for Consult / Principal Problem: [unfilled]    HPI: Raleigh Badillo is a 48y o  year old female with history of allergic rhinitis, mitral regurgitation, presents for evaluation of elevation of GGT  Patient recently underwent blood work was and was noted to have mild elevation of GGT at 69    AST is 20, ALT 26, alk-phos isoenzymes are normal   Patient denies history of liver disease  Denies jaundice, peripheral edema or abdominal distention  She denies family history of liver disease  She denies ever having had tattoos  She states that she does not drink alcohol and does not smoke  She denies unintentional weight loss  No recent or herbal supplements  Review of Systems: The remainder of the review of systems was negative except for the pertinent positives noted in HPI  Historical Information   Past Medical History:   Diagnosis Date    Allergic rhinitis     History of screening mammography     last assessed: 10/28/2013    Left lumbar radiculopathy     last assessed: 08/02/2017    Mitral valve regurgitation     trace, by prior echocardiogram    Tricuspid regurgitation     trace, by prior echocardiogram    Vagina bleeding     last assessed: 12/08/2014     Past Surgical History:   Procedure Laterality Date    EPIDURAL BLOCK INJECTION Right 1/18/2018    Procedure: TRANSFORAMINAL L5-S1;  Surgeon: Alonzo Lee MD;  Location: [facility] MAIN OR;  Service: Pain Management     HYSTERECTOMY       Social History   Social History     Substance and Sexual Activity   Alcohol Use No    Comment: (Occasional alcohol use-as per [employer])     Social History     Substance and Sexual Activity   Drug Use No     Social History     Tobacco Use   Smoking Status Never Smoker   Smokeless Tobacco Never Used     Family History   Problem Relation Age of Onset    Heart disease Family     Hypertension Family     Thyroid disease Family     Leukemia Other     Melanoma Other         malignant, of the skin       Meds/Allergies       (Not in a hospital admission)  No current facility-administered medications for this visit  Allergies   Allergen Reactions    Penicillins        Objective     Blood pressure 110/80, pulse 80, temperature 98 9 °F (37 2 °C), temperature source Tympanic, resp   rate 16, height 5' 3" (1 6 m), weight 56 5 kg (124 lb 9 6 oz)     [unfilled]    PHYSICAL EXAM     GEN: well nourished, well developed, no acute distress  HEENT: anicteric, MMM, no cervical or supraclavicular lymphadenopathy  CV: RRR, no m/r/g  CHEST: CTA b/l, no WRR  ABD: +BS, soft, NT/ND, no hepatosplenomegaly  EXT: no c/c/e  SKIN: no rashes,  NEURO: aaox3    Lab Results:   No visits with results within 1 Day(s) from this visit     Latest known visit with results is:   Office Visit on 03/14/2019   Component Date Value    Glucose, Random 05/02/2019 96     BUN 05/02/2019 14     Creatinine 05/02/2019 0 75     eGFR Non African American 05/02/2019 93     eGFR  05/02/2019 107     SL AMB BUN/CREATININE RA* 05/02/2019 19     Sodium 05/02/2019 142     Potassium 05/02/2019 3 8     Chloride 05/02/2019 105     CO2 05/02/2019 22     CALCIUM 05/02/2019 9 3     Protein, Total 05/02/2019 6 6     Albumin 05/02/2019 4 5     Globulin, Total 05/02/2019 2 1     Albumin/Globulin Ratio 05/02/2019 2 1     TOTAL BILIRUBIN 05/02/2019 0 5     Alk Phos Isoenzymes 05/02/2019 45     AST 05/02/2019 20     ALT 05/02/2019 26     GGT 05/02/2019 69*     Imaging Studies: I have personally reviewed pertinent films in PACS

## 2019-08-20 ENCOUNTER — OFFICE VISIT (OUTPATIENT)
Dept: CARDIOLOGY CLINIC | Facility: CLINIC | Age: 54
End: 2019-08-20
Payer: COMMERCIAL

## 2019-08-20 VITALS
DIASTOLIC BLOOD PRESSURE: 74 MMHG | BODY MASS INDEX: 22.32 KG/M2 | HEIGHT: 63 IN | SYSTOLIC BLOOD PRESSURE: 108 MMHG | WEIGHT: 126 LBS | HEART RATE: 97 BPM | OXYGEN SATURATION: 84 %

## 2019-08-20 DIAGNOSIS — R00.2 PALPITATIONS: Primary | ICD-10-CM

## 2019-08-20 DIAGNOSIS — E78.5 DYSLIPIDEMIA: ICD-10-CM

## 2019-08-20 PROCEDURE — 99213 OFFICE O/P EST LOW 20 MIN: CPT | Performed by: INTERNAL MEDICINE

## 2019-08-20 NOTE — PROGRESS NOTES
Cardiology Follow Up    Lane Escoto  1965  [id number]      Interval History: Lane Escoto is here for follow up of recent cardiac testing done for the evaluation of palpitations  She has occasional palpitations which she describes as a flipping sensation in her chest or occasionally as a fast, racing heartbeat     Palpitations are unrelated to exertion or caffeine  Echocardiogram was done which showed a structurally normal heart  Holter monitor showed PACs but no significant arrhythmias  She has been feeling well since her last visit and has palpitations only occasionally  The following portions of the patient's history were reviewed and updated as appropriate: allergies, current medications, past family history, past medical history, past social history, past surgical history and problem list     Current Outpatient Medications on File Prior to Visit   Medication Sig Dispense Refill    Ascorbic Acid (VITAMIN C) 100 MG tablet Take 100 mg by mouth daily      atorvastatin (LIPITOR) 20 mg tablet Take 1 tablet (20 mg total) by mouth daily 90 tablet 3    Cholecalciferol 1000 units capsule Take 1,000 Units by mouth daily      Multiple Vitamin (MULTIVITAMIN) tablet Take 1 tablet by mouth daily      SUMAtriptan (IMITREX) 50 mg tablet Take 1 tablet (50 mg total) by mouth once as needed for migraine for up to 1 dose 15 tablet 6    multivitamin (THERAGRAN) TABS Take 1 tablet by mouth daily      Na Sulfate-K Sulfate-Mg Sulf 17 5-3 13-1 6 EMILIO/171VU SOLN Take 1 applicator by mouth once for 1 dose 1 Bottle 0    thiamine (VITAMIN B1) 100 mg tablet Take 100 mg by mouth daily       No current facility-administered medications on file prior to visit             Review of Systems:  Review of Systems    Physical Exam:  Physical Exam    Cardiographics  ECG: normal sinus rhythm, no blocks or conduction defects, no ischemic changes    Labs:  Lab Results   Component Value Date    K 3 8 05/02/2019     05/02/2019    CO2 22 05/02/2019    BUN 14 05/02/2019    CREATININE 0 75 05/02/2019     No results found for: WBC, HGB, HCT, MCV, PLT  No results found for: CHOL, TRIG, HDL, LDLDIRECT  Imaging: No results found  Discussion/Summary:  1  Palpitations    2  Dyslipidemia        - reviewed results of recent studies with patient   - No findings to suggest a malignant cause for palpitations by history or with studies completed  She has a structurally normal heart and does not have pulmonary hypertension present   - Holter monitor showed PACs  Discussed medication therapy if symptoms progress  - She will call/return if any change in symptoms or if she develops any symptoms of syncope/near syncope  - Followup lipid panel ordered by PMD     Prevention:  Last lipid panel:       Current BP: /74 (BP Location: Left arm, Patient Position: Sitting, Cuff Size: Standard)   Pulse 97   Ht 5' 3" (1 6 m)   Wt 57 2 kg (126 lb)   SpO2 (!) 84%   BMI 22 32 kg/m²      Weight: Current BMI Body mass index is 22 32 kg/m²       Smoking: Counseling given: Not Answered

## 2019-08-30 ENCOUNTER — TELEPHONE (OUTPATIENT)
Dept: GASTROENTEROLOGY | Facility: AMBULARY SURGERY CENTER | Age: 54
End: 2019-08-30

## 2019-08-30 NOTE — TELEPHONE ENCOUNTER
DR Keo Villarreal PT    Pt pharmacy called requesting another bowel prep because insurance doesn't covered

## 2019-09-08 LAB
ALBUMIN SERPL-MCNC: 4.4 G/DL (ref 3.5–5.5)
ALBUMIN/GLOB SERPL: 2 {RATIO} (ref 1.2–2.2)
ALP SERPL-CCNC: 48 IU/L (ref 39–117)
ALT SERPL-CCNC: 30 IU/L (ref 0–32)
AST SERPL-CCNC: 25 IU/L (ref 0–40)
BASOPHILS # BLD AUTO: 0 X10E3/UL (ref 0–0.2)
BASOPHILS NFR BLD AUTO: 1 %
BILIRUB SERPL-MCNC: 0.4 MG/DL (ref 0–1.2)
BUN SERPL-MCNC: 15 MG/DL (ref 6–24)
BUN/CREAT SERPL: 19 (ref 9–23)
CALCIUM SERPL-MCNC: 9.7 MG/DL (ref 8.7–10.2)
CHLORIDE SERPL-SCNC: 104 MMOL/L (ref 96–106)
CO2 SERPL-SCNC: 24 MMOL/L (ref 20–29)
CREAT SERPL-MCNC: 0.77 MG/DL (ref 0.57–1)
EOSINOPHIL # BLD AUTO: 0.1 X10E3/UL (ref 0–0.4)
EOSINOPHIL NFR BLD AUTO: 1 %
ERYTHROCYTE [DISTWIDTH] IN BLOOD BY AUTOMATED COUNT: 13.5 % (ref 12.3–15.4)
GGT SERPL-CCNC: 40 IU/L (ref 0–60)
GLOBULIN SER-MCNC: 2.2 G/DL (ref 1.5–4.5)
GLUCOSE SERPL-MCNC: 103 MG/DL (ref 65–99)
HCT VFR BLD AUTO: 39.7 % (ref 34–46.6)
HGB BLD-MCNC: 13.3 G/DL (ref 11.1–15.9)
IMM GRANULOCYTES # BLD: 0 X10E3/UL (ref 0–0.1)
IMM GRANULOCYTES NFR BLD: 0 %
LYMPHOCYTES # BLD AUTO: 1.7 X10E3/UL (ref 0.7–3.1)
LYMPHOCYTES NFR BLD AUTO: 44 %
MCH RBC QN AUTO: 30.5 PG (ref 26.6–33)
MCHC RBC AUTO-ENTMCNC: 33.5 G/DL (ref 31.5–35.7)
MCV RBC AUTO: 91 FL (ref 79–97)
MONOCYTES # BLD AUTO: 0.2 X10E3/UL (ref 0.1–0.9)
MONOCYTES NFR BLD AUTO: 6 %
NEUTROPHILS # BLD AUTO: 1.9 X10E3/UL (ref 1.4–7)
NEUTROPHILS NFR BLD AUTO: 48 %
PLATELET # BLD AUTO: 195 X10E3/UL (ref 150–450)
POTASSIUM SERPL-SCNC: 4.3 MMOL/L (ref 3.5–5.2)
PROT SERPL-MCNC: 6.6 G/DL (ref 6–8.5)
RBC # BLD AUTO: 4.36 X10E6/UL (ref 3.77–5.28)
SL AMB EGFR AFRICAN AMERICAN: 103 ML/MIN/1.73
SL AMB EGFR NON AFRICAN AMERICAN: 90 ML/MIN/1.73
SODIUM SERPL-SCNC: 143 MMOL/L (ref 134–144)
WBC # BLD AUTO: 3.9 X10E3/UL (ref 3.4–10.8)

## 2019-09-09 LAB
A1AT SERPL-MCNC: 112 MG/DL (ref 90–200)
ACTIN IGG SERPL-ACNC: 5 UNITS (ref 0–19)
CERULOPLASMIN SERPL-MCNC: 22.8 MG/DL (ref 19–39)
FERRITIN SERPL-MCNC: 82 NG/ML (ref 15–150)
MITOCHONDRIA M2 IGG SER-ACNC: <20 UNITS (ref 0–20)

## 2019-09-10 ENCOUNTER — TELEPHONE (OUTPATIENT)
Dept: GASTROENTEROLOGY | Facility: AMBULARY SURGERY CENTER | Age: 54
End: 2019-09-10

## 2019-09-10 NOTE — TELEPHONE ENCOUNTER
----- Message from Veronique Blair MD sent at 9/9/2019  5:28 PM EDT -----  Please inform the patient that the liver function tests have now normalized  Remainder of the workup is completely normal   Follow up the results of the ultrasound which has not yet been done

## 2019-09-16 ENCOUNTER — TRANSCRIBE ORDERS (OUTPATIENT)
Dept: ADMINISTRATIVE | Facility: HOSPITAL | Age: 54
End: 2019-09-16

## 2019-09-16 DIAGNOSIS — Z00.00 HEALTHCARE MAINTENANCE: Primary | ICD-10-CM

## 2019-09-17 NOTE — PRE-PROCEDURE INSTRUCTIONS
Pre-Surgery Instructions:   Medication Instructions    Ascorbic Acid (VITAMIN C) 100 MG tablet Patient was instructed by Physician and understands   Cholecalciferol 1000 units capsule Patient was instructed by Physician and understands   Multiple Vitamin (MULTIVITAMIN) tablet Patient was instructed by Physician and understands   SUMAtriptan (IMITREX) 50 mg tablet Patient was instructed by Physician and understands   thiamine (VITAMIN B1) 100 mg tablet Patient was instructed by Physician and understands

## 2019-09-18 ENCOUNTER — ANESTHESIA EVENT (OUTPATIENT)
Dept: GASTROENTEROLOGY | Facility: AMBULARY SURGERY CENTER | Age: 54
End: 2019-09-18

## 2019-09-18 NOTE — ANESTHESIA PREPROCEDURE EVALUATION
Review of Systems/Medical History  Patient summary reviewed  Chart reviewed  No history of anesthetic complications     Cardiovascular  Exercise tolerance (METS): >4,  Hyperlipidemia, No angina , No OVALLES,    Pulmonary  Not a smoker , No shortness of breath, No recent URI ,        GI/Hepatic    GERD , Bowel prep       Kidney disease ,        Endo/Other     GYN       Hematology   Musculoskeletal       Neurology    Headaches,    Psychology           Physical Exam    Airway    Mallampati score: II  TM Distance: >3 FB  Neck ROM: full     Dental   No notable dental hx     Cardiovascular  Cardiovascular exam normal    Pulmonary  Pulmonary exam normal     Other Findings        Anesthesia Plan  ASA Score- 2     Anesthesia Type- IV sedation with anesthesia with ASA Monitors  Additional Monitors:   Airway Plan:         Plan Factors-    Induction- intravenous  Postoperative Plan-     Informed Consent- Anesthetic plan and risks discussed with patient

## 2019-09-19 ENCOUNTER — ANESTHESIA (OUTPATIENT)
Dept: GASTROENTEROLOGY | Facility: AMBULARY SURGERY CENTER | Age: 54
End: 2019-09-19

## 2019-09-19 ENCOUNTER — HOSPITAL ENCOUNTER (OUTPATIENT)
Dept: GASTROENTEROLOGY | Facility: AMBULARY SURGERY CENTER | Age: 54
Setting detail: OUTPATIENT SURGERY
Discharge: HOME | End: 2019-09-19
Attending: INTERNAL MEDICINE | Admitting: INTERNAL MEDICINE
Payer: COMMERCIAL

## 2019-09-19 ENCOUNTER — TELEPHONE (OUTPATIENT)
Dept: FAMILY MEDICINE CLINIC | Facility: CLINIC | Age: 54
End: 2019-09-19

## 2019-09-19 VITALS
HEIGHT: 63 IN | RESPIRATION RATE: 18 BRPM | HEART RATE: 83 BPM | BODY MASS INDEX: 22.32 KG/M2 | SYSTOLIC BLOOD PRESSURE: 134 MMHG | WEIGHT: 126 LBS | DIASTOLIC BLOOD PRESSURE: 80 MMHG | OXYGEN SATURATION: 95 % | TEMPERATURE: 96 F

## 2019-09-19 DIAGNOSIS — Z12.11 COLON CANCER SCREENING: ICD-10-CM

## 2019-09-19 DIAGNOSIS — E78.5 HYPERLIPIDEMIA, UNSPECIFIED HYPERLIPIDEMIA TYPE: Primary | ICD-10-CM

## 2019-09-19 PROCEDURE — 45385 COLONOSCOPY W/LESION REMOVAL: CPT | Performed by: INTERNAL MEDICINE

## 2019-09-19 PROCEDURE — 88305 TISSUE EXAM BY PATHOLOGIST: CPT | Performed by: PATHOLOGY

## 2019-09-19 RX ORDER — LIDOCAINE HYDROCHLORIDE 10 MG/ML
0.5 INJECTION, SOLUTION EPIDURAL; INFILTRATION; INTRACAUDAL; PERINEURAL ONCE AS NEEDED
Status: DISCONTINUED | OUTPATIENT
Start: 2019-09-19 | End: 2019-09-23 | Stop reason: HOSPADM

## 2019-09-19 RX ORDER — PROPOFOL 10 MG/ML
INJECTION, EMULSION INTRAVENOUS CONTINUOUS PRN
Status: DISCONTINUED | OUTPATIENT
Start: 2019-09-19 | End: 2019-09-19 | Stop reason: SURG

## 2019-09-19 RX ORDER — LIDOCAINE HYDROCHLORIDE 10 MG/ML
INJECTION, SOLUTION INFILTRATION; PERINEURAL AS NEEDED
Status: DISCONTINUED | OUTPATIENT
Start: 2019-09-19 | End: 2019-09-19 | Stop reason: SURG

## 2019-09-19 RX ORDER — PROPOFOL 10 MG/ML
INJECTION, EMULSION INTRAVENOUS AS NEEDED
Status: DISCONTINUED | OUTPATIENT
Start: 2019-09-19 | End: 2019-09-19 | Stop reason: SURG

## 2019-09-19 RX ORDER — SODIUM CHLORIDE 9 MG/ML
125 INJECTION, SOLUTION INTRAVENOUS CONTINUOUS
Status: DISCONTINUED | OUTPATIENT
Start: 2019-09-19 | End: 2019-09-23 | Stop reason: HOSPADM

## 2019-09-19 RX ADMIN — PROPOFOL 70 MG: 10 INJECTION, EMULSION INTRAVENOUS at 11:55

## 2019-09-19 RX ADMIN — SODIUM CHLORIDE 125 ML/HR: 0.9 INJECTION, SOLUTION INTRAVENOUS at 11:07

## 2019-09-19 RX ADMIN — LIDOCAINE HYDROCHLORIDE 50 MG: 10 INJECTION, SOLUTION INFILTRATION; PERINEURAL at 11:55

## 2019-09-19 RX ADMIN — PROPOFOL 150 MCG/KG/MIN: 10 INJECTION, EMULSION INTRAVENOUS at 11:55

## 2019-09-19 RX ADMIN — PROPOFOL 30 MG: 10 INJECTION, EMULSION INTRAVENOUS at 11:58

## 2019-09-19 NOTE — H&P
History and Physical -  [facility]  Spencer Nice 48 y o  female MRN: [id number]    HPI: Spencer Nice is a 48y o  year old female who presents with colon cancer screening        Review of Systems    Historical Information   Past Medical History:   Diagnosis Date    Allergic rhinitis     History of screening mammography     last assessed: 10/28/2013    Hyperlipidemia     Left lumbar radiculopathy     last assessed: 08/02/2017    Mitral valve regurgitation     trace, by prior echocardiogram    Tricuspid regurgitation     trace, by prior echocardiogram    Vagina bleeding     last assessed: 12/08/2014     Past Surgical History:   Procedure Laterality Date    EPIDURAL BLOCK INJECTION Right 1/18/2018    Procedure: TRANSFORAMINAL L5-S1;  Surgeon: Debbi Oliveira MD;  Location: [facility] MAIN OR;  Service: Pain Management     HYSTERECTOMY       Social History   Social History     Substance and Sexual Activity   Alcohol Use No    Comment: (Occasional alcohol use-as per [employer])     Social History     Substance and Sexual Activity   Drug Use No     Social History     Tobacco Use   Smoking Status Never Smoker   Smokeless Tobacco Never Used     Family History   Problem Relation Age of Onset    Heart disease Family     Hypertension Family     Thyroid disease Family     Leukemia Other     Melanoma Other         malignant, of the skin    Hypertension Mother     Hyperlipidemia Mother     Heart disease Mother     Hypertension Sister     Hyperlipidemia Sister     No Known Problems Son        Meds/Allergies       (Not in a hospital admission)    Allergies   Allergen Reactions    Penicillins      As a child unsure of reaction       Objective     /77   Pulse 73   Temp (!) 96 °F (35 6 °C)   Resp 18   Ht 5' 3" (1 6 m)   Wt 57 2 kg (126 lb)   SpO2 96%   BMI 22 32 kg/m²       PHYSICAL EXAM    Gen: NAD  CV: RRR  CHEST: Clear  ABD: soft, NT/ND  EXT: no edema  Neuro: AAO      ASSESSMENT/PLAN:  This is a 48y o  year old female here for colon cancer screening  PLAN:   Procedure:  Colonoscopy

## 2019-09-19 NOTE — ANESTHESIA POSTPROCEDURE EVALUATION
Post-Op Assessment Note    CV Status:  Stable  Pain Score: 0    Pain management: adequate     Mental Status:  Alert and awake   Hydration Status:  Stable   PONV Controlled:  None   Airway Patency:  Patent    Post Op Vitals Reviewed: No      Staff: Anesthesiologist           BP      Temp     Pulse     Resp      SpO2

## 2019-09-19 NOTE — TELEPHONE ENCOUNTER
Dr Bertha Whelan     Could you please do an order for BW cholesterol to the lab [facility]    Pt has an apt with you on oct 3, 2019  4 30pm     Please call pt when is ready to  thanks

## 2019-09-26 ENCOUNTER — TELEPHONE (OUTPATIENT)
Dept: GASTROENTEROLOGY | Facility: CLINIC | Age: 54
End: 2019-09-26

## 2019-09-26 NOTE — TELEPHONE ENCOUNTER
----- Message from Samreen Olmos MD sent at 9/25/2019 10:19 AM EDT -----  Please inform the patient that 1 of the polyps removed was adenomatous polyps, would recommend repeat colonoscopy in 3 years

## 2019-09-26 NOTE — LETTER
September 26, 2019     [address] [facility] [address] [id number]      Dear Ms Kowalski: We have attempted to reach you regarding your results  We ask that you please contact our office upon receipt of this letter to receive your results  Thank you in advance for your cooperation and assistance          Sincerely,   Prem Randhawa [facility] Staff  [phone]

## 2019-10-03 NOTE — TELEPHONE ENCOUNTER
Pt came into office regarding results  I printed them out for her as well as went over them  Pt expressed understanding and stated we can cancel her follow up as she is not having any symptoms at this time

## 2019-11-06 ENCOUNTER — HOSPITAL ENCOUNTER (OUTPATIENT)
Dept: RADIOLOGY | Facility: HOSPITAL | Age: 54
Discharge: HOME | End: 2019-11-06
Attending: OBSTETRICS & GYNECOLOGY
Payer: COMMERCIAL

## 2019-11-06 VITALS — BODY MASS INDEX: 22.32 KG/M2 | WEIGHT: 126 LBS | HEIGHT: 63 IN

## 2019-11-06 DIAGNOSIS — Z00.00 HEALTHCARE MAINTENANCE: ICD-10-CM

## 2019-11-06 PROCEDURE — 77067 SCR MAMMO BI INCL CAD: CPT

## 2019-11-06 PROCEDURE — 77063 BREAST TOMOSYNTHESIS BI: CPT

## 2020-01-07 DIAGNOSIS — G43.919 INTRACTABLE MIGRAINE WITHOUT STATUS MIGRAINOSUS, UNSPECIFIED MIGRAINE TYPE: ICD-10-CM

## 2020-01-07 RX ORDER — SUMATRIPTAN 50 MG/1
TABLET, FILM COATED ORAL
Qty: 15 TABLET | Refills: 6 | Status: SHIPPED | OUTPATIENT
Start: 2020-01-07 | End: 2020-01-09 | Stop reason: SDUPTHER

## 2020-01-09 ENCOUNTER — OFFICE VISIT (OUTPATIENT)
Dept: FAMILY MEDICINE CLINIC | Facility: CLINIC | Age: 55
End: 2020-01-09
Payer: COMMERCIAL

## 2020-01-09 VITALS
BODY MASS INDEX: 22.68 KG/M2 | HEART RATE: 92 BPM | TEMPERATURE: 98.2 F | HEIGHT: 63 IN | DIASTOLIC BLOOD PRESSURE: 82 MMHG | OXYGEN SATURATION: 100 % | SYSTOLIC BLOOD PRESSURE: 130 MMHG | WEIGHT: 128 LBS

## 2020-01-09 DIAGNOSIS — Z88.9 MULTIPLE ALLERGIES: Primary | ICD-10-CM

## 2020-01-09 DIAGNOSIS — G43.919 INTRACTABLE MIGRAINE WITHOUT STATUS MIGRAINOSUS, UNSPECIFIED MIGRAINE TYPE: ICD-10-CM

## 2020-01-09 PROCEDURE — 99213 OFFICE O/P EST LOW 20 MIN: CPT | Performed by: FAMILY MEDICINE

## 2020-01-09 RX ORDER — SUMATRIPTAN 50 MG/1
50 TABLET, FILM COATED ORAL ONCE AS NEEDED
Qty: 15 TABLET | Refills: 6 | Status: SHIPPED | OUTPATIENT
Start: 2020-01-09 | End: 2020-12-07 | Stop reason: SDUPTHER

## 2020-01-09 NOTE — PROGRESS NOTES
[id number] Note  [place], 22     Edin Dudley MRN: [id number] : 1965 Age: 48 y o  Assessment/Plan        1  Multiple allergies  Ambulatory referral to Allergy   2  Intractable migraine without status migrainosus, unspecified migraine type  SUMAtriptan (IMITREX) 50 mg tablet    advise to combine Imitrex and Ibuprofen, ahve have a headache diary, follow up in 1 month       Referral placed for allergist given patient's report of worsening allergies despite using medications  Migraine medications refilled  Lipid panel reprinted  Preventative measures discussed  Pt declined flu shot today  Reports she had mammogram and pap with her OBGYN in 2019  Edin Dudley acknowledged understanding of treatment plan, all questions answered  Plan discussed with attending physician Dr Robbie Lacy is a 48 y o  female  Presents for follow up, med refills  She has not been seen in office since May 2019  Since then she has colonoscopy 19 with single polyp and internal hemorrhoids and recommendation for 3 yr follow up  She had mammogram 19 which was normal and recommendation for 1 yr follow up  A lipid panel was ordered 2019 but not done  She is also requesting referral to allergist  Seemed to be seasonal and claritin used to help but has gotten much worse  Reports she had pap recently by Dr Lito Lima      The following portions of the patient's history were reviewed and updated as appropriate: allergies, current medications, past family history, past medical history, past social history, past surgical history and problem list     Review of Systems   Constitutional: Negative for chills and fever  Respiratory: Negative for cough and shortness of breath  Cardiovascular: Negative for chest pain  Allergic/Immunologic: Positive for environmental allergies  Negative for food allergies           Current Outpatient Medications   Medication Sig Dispense Refill    Ascorbic Acid (VITAMIN C) 100 MG tablet Take 100 mg by mouth daily      Cholecalciferol 1000 units capsule Take 1,000 Units by mouth daily      Multiple Vitamin (MULTIVITAMIN) tablet Take 1 tablet by mouth daily      Na Sulfate-K Sulfate-Mg Sulf 17 5-3 13-1 6 ID/097IE ARMANI Take 1 applicator by mouth once for 1 dose 1 Bottle 0    SUMAtriptan (IMITREX) 50 mg tablet Take 1 tablet (50 mg total) by mouth once as needed for migraine 15 tablet 6    thiamine (VITAMIN B1) 100 mg tablet Take 100 mg by mouth daily       No current facility-administered medications for this visit  Objective      /82   Pulse 92   Temp 98 2 °F (36 8 °C)   Ht 5' 3" (1 6 m)   Wt 58 1 kg (128 lb)   SpO2 100%   BMI 22 67 kg/m²     Physical Exam   Constitutional: She is oriented to person, place, and time  She appears well-developed and well-nourished  No distress  HENT:   Mouth/Throat: Oropharynx is clear and moist    Eyes: Conjunctivae are normal    Cardiovascular: Normal rate, regular rhythm and normal heart sounds  No murmur heard  Pulmonary/Chest: Effort normal and breath sounds normal  No respiratory distress  Neurological: She is alert and oriented to person, place, and time  Skin: Skin is warm and dry  She is not diaphoretic  Vitals reviewed  Some portions of this record may have been generated with voice recognition software  There may be translation, syntax, or grammatical errors  Occasional wrong word or "sound-a-like" substitutions may have occurred due to the inherent limitations of the voice recognition software  Read the chart carefully and recognize, using context, where substations may have occurred  If you have any questions, please contact the dictating provider for clarification or correction, as needed

## 2020-01-14 LAB
CHOLEST SERPL-MCNC: 211 MG/DL (ref 100–199)
CHOLEST/HDLC SERPL: 3.1 RATIO (ref 0–4.4)
HDLC SERPL-MCNC: 67 MG/DL
LDLC SERPL CALC-MCNC: 128 MG/DL (ref 0–99)
SL AMB VLDL CHOLESTEROL CALC: 16 MG/DL (ref 5–40)
TRIGL SERPL-MCNC: 81 MG/DL (ref 0–149)

## 2020-01-29 ENCOUNTER — OFFICE VISIT (OUTPATIENT)
Dept: FAMILY MEDICINE CLINIC | Facility: CLINIC | Age: 55
End: 2020-01-29
Payer: COMMERCIAL

## 2020-01-29 VITALS
DIASTOLIC BLOOD PRESSURE: 80 MMHG | SYSTOLIC BLOOD PRESSURE: 130 MMHG | HEART RATE: 88 BPM | OXYGEN SATURATION: 100 % | HEIGHT: 63 IN | BODY MASS INDEX: 22.68 KG/M2 | WEIGHT: 128 LBS | RESPIRATION RATE: 18 BRPM

## 2020-01-29 DIAGNOSIS — Z71.82 EXERCISE COUNSELING: ICD-10-CM

## 2020-01-29 DIAGNOSIS — Z71.3 NUTRITIONAL COUNSELING: ICD-10-CM

## 2020-01-29 DIAGNOSIS — Z71.2 ENCOUNTER TO DISCUSS TEST RESULTS: Primary | ICD-10-CM

## 2020-01-29 PROCEDURE — 1036F TOBACCO NON-USER: CPT | Performed by: FAMILY MEDICINE

## 2020-01-29 PROCEDURE — 3008F BODY MASS INDEX DOCD: CPT | Performed by: FAMILY MEDICINE

## 2020-01-29 PROCEDURE — 99213 OFFICE O/P EST LOW 20 MIN: CPT | Performed by: FAMILY MEDICINE

## 2020-01-30 NOTE — PROGRESS NOTES
[id number] Note  [place], 22     Mariann Pallas MRN: [id number] : 1965 Age: 48 y o  Assessment/Plan        1  Encounter to discuss test results     2  Exercise counseling     3  Nutritional counseling     4  BMI 22 0-22 9, adult         Presents today to discuss lab results, recent lipid panel reviewed with patient, no indication to start medication at this time  Encouraged continued healthy lifestyle choices including cutting back on processed foods and added sugar, more whole foods, fruits/vegetables, & continued regular exercise  Mariann Pallas acknowledged understanding of treatment plan, all questions answered  Plan discussed with attending physician Dr Alfred Singleton      Mariann Pallas is a 48 y o  female  Presents today for follow up  Last seen in office 2020, at that time had lipid panel checked  Here today for review  No previous results available for comparison, results 2020 notable for elevated total cholesterol at 211, TG WNL at 81, HDL acceptable at 67, LDL elevated at 128  Today has ASCVD risk is 1 3%  She has no complaints today  Reports she has follow up with allergist next week  The following portions of the patient's history were reviewed and updated as appropriate: allergies, current medications, past family history, past medical history, past social history, past surgical history and problem list     Review of Systems   Constitutional: Negative for chills and fever  HENT: Negative for congestion and sore throat  Respiratory: Negative for cough and shortness of breath  Cardiovascular: Negative for chest pain           Current Outpatient Medications   Medication Sig Dispense Refill    Ascorbic Acid (VITAMIN C) 100 MG tablet Take 100 mg by mouth daily      Cholecalciferol 1000 units capsule Take 1,000 Units by mouth daily      Multiple Vitamin (MULTIVITAMIN) tablet Take 1 tablet by mouth daily      Na Sulfate-K Sulfate-Mg Sulf 17 5-3 13-1 6 OX/503UM SOLN Take 1 applicator by mouth once for 1 dose 1 Bottle 0    SUMAtriptan (IMITREX) 50 mg tablet Take 1 tablet (50 mg total) by mouth once as needed for migraine 15 tablet 6    thiamine (VITAMIN B1) 100 mg tablet Take 100 mg by mouth daily       No current facility-administered medications for this visit  Objective      /80   Pulse 88   Resp 18   Ht 5' 3" (1 6 m)   Wt 58 1 kg (128 lb)   SpO2 100%   BMI 22 67 kg/m²     Physical Exam   Constitutional: She is oriented to person, place, and time  She appears well-developed and well-nourished  No distress  HENT:   Head: Normocephalic and atraumatic  Eyes: Conjunctivae are normal    Cardiovascular: Normal rate, regular rhythm and normal heart sounds  No murmur heard  Pulmonary/Chest: Effort normal and breath sounds normal  No respiratory distress  Neurological: She is alert and oriented to person, place, and time  Skin: Skin is warm and dry  Vitals reviewed  Some portions of this record may have been generated with voice recognition software  There may be translation, syntax, or grammatical errors  Occasional wrong word or "sound-a-like" substitutions may have occurred due to the inherent limitations of the voice recognition software  Read the chart carefully and recognize, using context, where substations may have occurred  If you have any questions, please contact the dictating provider for clarification or correction, as needed

## 2020-03-12 ENCOUNTER — OFFICE VISIT (OUTPATIENT)
Dept: PODIATRY | Facility: CLINIC | Age: 55
End: 2020-03-12
Payer: COMMERCIAL

## 2020-03-12 VITALS — BODY MASS INDEX: 22.68 KG/M2 | HEIGHT: 63 IN | WEIGHT: 128 LBS

## 2020-03-12 DIAGNOSIS — M79.672 LEFT FOOT PAIN: ICD-10-CM

## 2020-03-12 DIAGNOSIS — M76.72 PERONEAL TENDINITIS, LEFT: ICD-10-CM

## 2020-03-12 DIAGNOSIS — M25.572 ARTHRALGIA OF ANKLE OR FOOT, LEFT: Primary | ICD-10-CM

## 2020-03-12 PROCEDURE — 73630 X-RAY EXAM OF FOOT: CPT | Performed by: PODIATRIST

## 2020-03-12 PROCEDURE — 99213 OFFICE O/P EST LOW 20 MIN: CPT | Performed by: PODIATRIST

## 2020-03-12 RX ORDER — MELOXICAM 7.5 MG/1
7.5 TABLET ORAL DAILY
Qty: 30 TABLET | Refills: 0 | Status: SHIPPED | OUTPATIENT
Start: 2020-03-12 | End: 2020-03-25 | Stop reason: SINTOL

## 2020-03-24 ENCOUNTER — TELEMEDICINE (OUTPATIENT)
Dept: FAMILY MEDICINE CLINIC | Facility: CLINIC | Age: 55
End: 2020-03-24
Payer: COMMERCIAL

## 2020-03-24 DIAGNOSIS — T78.3XXA ANGIOEDEMA, INITIAL ENCOUNTER: Primary | ICD-10-CM

## 2020-03-24 PROCEDURE — 99213 OFFICE O/P EST LOW 20 MIN: CPT | Performed by: FAMILY MEDICINE

## 2020-03-24 RX ORDER — CETIRIZINE HYDROCHLORIDE 10 MG/1
10 TABLET ORAL DAILY
Qty: 30 TABLET | Refills: 0 | Status: SHIPPED | OUTPATIENT
Start: 2020-03-24

## 2020-03-24 NOTE — PROGRESS NOTES
Virtual Regular Visit    Problem List Items Addressed This Visit     None      Visit Diagnoses     Angioedema, initial encounter    -  Primary    Relevant Medications    cetirizine (ZyrTEC) 10 mg tablet          Angioedema  47 y/o F with new onset angioedema for 1 day  Suspect to be secondary to taking NSAID  Patient also sounded like she had muffled voice over the phone  Recommended to stop taking medication immediately, prescribed antihistamine blocker to take for the next 30 days  Advised to head to ED if swelling worsens and/or has difficulty breathing or swallowing for further management  Recommended to call back office next week if symptoms continue as further work up may be required  Ample time provided during visit to answer all questions  Recommended to call back office with any questions  Patient acknowledged understanding and verbally agreed with plan  Reason for visit is tongue swelling    Encounter provider Arben Galo MD    Provider located at [address] [place] [phone]      Recent Visits  No visits were found meeting these conditions  Showing recent visits within past 7 days and meeting all other requirements     Future Appointments  No visits were found meeting these conditions  Showing future appointments within next 150 days and meeting all other requirements        After connecting through [employer], the patient was identified by name and date of birth  Sam Patten was informed that this is a telemedicine visit and that the visit is being conducted through telephone which may not be secure and therefore, might not be HIPAA-compliant  Other methods to assure confidentiality were taken phone called made from resident area  The following individuals were in the room with me and the patient informed other residents    She acknowledged consent and understanding of privacy and security of the video platform  The patient has agreed to participate and understands they can discontinue the visit at any time  Subjective  David Mares is a 48 y o  female  Patient concerned about tongue swelling since yesterday, along with having a "weird sensation" behind her left ear  Denies any recent URI symptoms, fever, trouble swallowing, new food, rash  Reports she was prescribed recently meloxicam, which is the first time she was prescribed the medication  Past Medical History:   Diagnosis Date    Allergic rhinitis     History of screening mammography     last assessed: 10/28/2013    Hyperlipidemia     Left lumbar radiculopathy     last assessed: 08/02/2017    Mitral valve regurgitation     trace, by prior echocardiogram    Tricuspid regurgitation     trace, by prior echocardiogram    Vagina bleeding     last assessed: 12/08/2014       Past Surgical History:   Procedure Laterality Date    EPIDURAL BLOCK INJECTION Right 1/18/2018    Procedure: TRANSFORAMINAL L5-S1;  Surgeon: Mery Pena MD;  Location: [facility] MAIN OR;  Service: Pain Management     HYSTERECTOMY  2014       Current Outpatient Medications   Medication Sig Dispense Refill    Ascorbic Acid (VITAMIN C) 100 MG tablet Take 100 mg by mouth daily      cetirizine (ZyrTEC) 10 mg tablet Take 1 tablet (10 mg total) by mouth daily 30 tablet 0    Cholecalciferol 1000 units capsule Take 1,000 Units by mouth daily      meloxicam (MOBIC) 7 5 mg tablet Take 1 tablet (7 5 mg total) by mouth daily 30 tablet 0    Multiple Vitamin (MULTIVITAMIN) tablet Take 1 tablet by mouth daily      Na Sulfate-K Sulfate-Mg Sulf 17 5-3 13-1 6 RT/886SA SOLN Take 1 applicator by mouth once for 1 dose 1 Bottle 0    SUMAtriptan (IMITREX) 50 mg tablet Take 1 tablet (50 mg total) by mouth once as needed for migraine 15 tablet 6    thiamine (VITAMIN B1) 100 mg tablet Take 100 mg by mouth daily       No current facility-administered medications for this visit  Allergies   Allergen Reactions    Penicillins      As a child unsure of reaction       Review of Systems   Constitutional: Negative for activity change, appetite change, chills and fever  HENT: Negative for congestion, drooling, ear discharge, ear pain, hearing loss, postnasal drip, rhinorrhea, sinus pressure, sinus pain and sneezing  Tongue swelling         I spent 20 minutes with the patient during this visit

## 2020-03-26 NOTE — PROGRESS NOTES
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  Plan  Patient evaluated treatment options discussed, three views x-rays performed negative for fracture dislocation     Patient's left arch was bound a low dye arch strapping fashion  Patient educated on RICE therapy  Patient educated on proper shoe gear, Mobic ordered for pain management, patient return in 2 weeks for possible steroid injection  Diagnoses and all orders for this visit:    Arthralgia of ankle or foot, left  -     Discontinue: meloxicam (MOBIC) 7 5 mg tablet; Take 1 tablet (7 5 mg total) by mouth daily    Left foot pain    Peroneal tendinitis, left          Subjective:      Patient ID: Rock Huff is a 48 y o  female  Patient has return of pain in her left foot and ankle  No history of trauma  She has pain with ambulation      The following portions of the patient's history were reviewed and updated as appropriate: allergies, current medications, past family history, past medical history, past social history, past surgical history and problem list     Review of Systems   Constitutional: Negative  Respiratory: Negative  Cardiovascular: Negative  Musculoskeletal: Positive for arthralgias  Skin: Negative  Objective: Foot ExamPhysical Exam    Review of Systems      ROS reviewed  Constitutional: no fever,-- no recent weight gain-- and-- no recent weight loss  Cardiovascular: no chest pain,-- no palpitations-- and-- no lower extremity edema  Respiratory: no complaints of shortness of breath-- and-- no wheezing  Musculoskeletal: + difficulty walking,+ pain in extremity      Integumentary: no complaints of skin rash  Active Problems   1  Acquired ankle/foot deformity (736 70) (M21 969)   2  Alopecia areata (704 01) (L63 9)   3  Back pain (724 5) (M54 9)   4  Bilateral sciatica (724 3) (M54 31,M54 32)   5  Bony exostosis (726 91) (M89 8X9)   6   Chronic low back pain (724 2,338 29) (M54 5,G89 29)   7  Chronic pain syndrome (338 4) (G89 4)   8  Esophageal reflux (530 81) (K21 9)   9  Influenza vaccination declined (V64 06) (Z28 21)   10  Lumbar radiculopathy (724 4) (M54 16)   11  Migraine (346 90) (G43 909)   12  Pes planus, congenital (754 61) (Q66 50)   13  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)   14  Right ankle pain (719 47) (M25 571)   15  Subconjunctival hemorrhage of left eye (372 72) (H11 32)   16  Thoracic spine pain (724 1) (M54 6)     Past Medical History    · History of Acute back pain (724 5) (M54 9)   · History of Difficulty walking (719 7) (R26 2)   · History of acute pharyngitis (V12 69) (Z87 09)   · History of allergic rhinitis (V12 69) (Z87 09)   · History of conjunctivitis (V12 49) (Z86 69)   · History of contact dermatitis (V13 3) (Z87 2)   · History of screening mammography (V15 89) (Z92 89)   · History of vaginal bleeding (V13 29) (Z87 42)   · History of vaginal discharge (V13 29) (Z87 42)   · History of Left lumbar radiculopathy (724 4) (M54 16)   · History of Left shoulder pain (719 41) (M25 512)   · History of Leg pain (729 5) (M79 606)   · History of Normal breast exam   · History of Pelvic pain in female (625 9) (R10 2)   · History of Screening for depression (V79 0) (Z13 89)   · History of Skin rash (782 1) (R21)   · History of Trace mitral regurgitation by prior echocardiogram (424 0) (I34 0)   · History of Trace tricuspid regurgitation by prior echocardiogram (397 0) (I07 1)   · History of Vaginitis due to Candida (112 1) (B37 3)     The active problems and past medical history were reviewed and updated today  Surgical History    · History of Hysterectomy     The surgical history was reviewed and updated today         Family History   Aunt    · Family history of leukemia (V16 6) (Z80 6)  Uncle    · Family history of malignant melanoma of skin (V16 8) (Z80 8)  Family History    · Family history of heart disease (V17 49) (Z82 49)   · Family history of hypertension (V17 49) (Z82 49)   · Family history of thyroid disease (V18 19) (Z83 49)     The family history was reviewed and updated today  Social History    ·    · Never a smoker   · Occasional alcohol use  The social history was reviewed and updated today  The social history was reviewed and is unchanged  Current Meds    1  Daily Value Multivitamin Oral Tablet; Take 1 tablet daily Recorded   2  Ibuprofen 600 MG Oral Tablet; TAKE 1 TABLET Every 6 hours PRN pain After Meals; Therapy: [id number] to (Evaluate:21Apr2017)  Requested for: 47AAH2397; Last     Rx:07Mar2017 Ordered     The medication list was reviewed and updated today  Allergies   1  Penicillins  2  No Known Food Allergies   3  No Known Latex Allergies     Vitals     Recorded: 52MVE6610 03:52PM   Heart Rate 80   Respiration 17   Systolic 716   Diastolic 72   Height 5 ft 3 in   Weight 118 lb    BMI Calculated 20 9   BSA Calculated 1 55      Physical Exam   Left Foot: Appearance: Normal except as noted: excessive pronation-- and-- pes planus  Tenderness: The dorsal aspect of the midfoot and along 5th metatarsal base, pain and along the insertion of the peroneal tendon,,pain on abduction against resistance  Right Foot: Appearance: Normal except as noted: excessive pronation-- and-- pes planus  Tenderness: None   Left Ankle: ROM: Full  Motor: Normal     Right Ankle: Tenderness: None ROM: Full  Motor: Normal     Neurological Exam: performed  Light touch was intact bilaterally  Vibratory sensation was intact bilaterally  Response to monofilament test was intact bilaterally  Deep tendon reflexes: patellar reflex present bilaterally-- and-- achilles reflex present bilaterally  Vascular Exam: performed Dorsalis pedis pulses were present bilaterally  Posterior tibial pulses were present bilaterally  Elevation Pallor: absent bilaterally  Dependence rubor was absent bilaterally  Edema: none  Toenails:  All of the toenails were elongated-- and-- hypertrophied  Hyperkeratosis: present on both first toes  Shoe Gear Evaluation: performed ()  Recommendation(s): custom inlays  Results/Data   I personally reviewed the films/images/results in the office today  My interpretation follows  X-ray Review Mild rearfoot osteoarthritis noted  No evidence of fracture or bony mass

## 2020-05-12 ENCOUNTER — TELEMEDICINE (OUTPATIENT)
Dept: FAMILY MEDICINE CLINIC | Facility: CLINIC | Age: 55
End: 2020-05-12
Payer: COMMERCIAL

## 2020-05-12 DIAGNOSIS — T78.1XXD POLLEN-FOOD ALLERGY, SUBSEQUENT ENCOUNTER: Primary | ICD-10-CM

## 2020-05-12 PROCEDURE — 99213 OFFICE O/P EST LOW 20 MIN: CPT | Performed by: FAMILY MEDICINE

## 2020-09-02 ENCOUNTER — OFFICE VISIT (OUTPATIENT)
Dept: PODIATRY | Facility: CLINIC | Age: 55
End: 2020-09-02
Payer: COMMERCIAL

## 2020-09-02 VITALS
WEIGHT: 128 LBS | HEART RATE: 88 BPM | HEIGHT: 63 IN | BODY MASS INDEX: 22.68 KG/M2 | RESPIRATION RATE: 17 BRPM | SYSTOLIC BLOOD PRESSURE: 130 MMHG | DIASTOLIC BLOOD PRESSURE: 80 MMHG

## 2020-09-02 DIAGNOSIS — M79.671 RIGHT FOOT PAIN: ICD-10-CM

## 2020-09-02 DIAGNOSIS — M77.41 METATARSALGIA OF BOTH FEET: Primary | ICD-10-CM

## 2020-09-02 DIAGNOSIS — M21.41 ACQUIRED FLAT FOOT, RIGHT: ICD-10-CM

## 2020-09-02 DIAGNOSIS — M79.672 LEFT FOOT PAIN: ICD-10-CM

## 2020-09-02 DIAGNOSIS — M77.42 METATARSALGIA OF BOTH FEET: Primary | ICD-10-CM

## 2020-09-02 DIAGNOSIS — M21.42 ACQUIRED FLAT FOOT, LEFT: ICD-10-CM

## 2020-09-02 PROCEDURE — L3020 FOOT LONGITUD/METATARSAL SUP: HCPCS | Performed by: PODIATRIST

## 2020-09-02 PROCEDURE — 99213 OFFICE O/P EST LOW 20 MIN: CPT | Performed by: PODIATRIST

## 2020-09-02 RX ORDER — MELOXICAM 7.5 MG/1
7.5 TABLET ORAL DAILY
Qty: 30 TABLET | Refills: 0 | Status: SHIPPED | OUTPATIENT
Start: 2020-09-02 | End: 2021-01-27 | Stop reason: SDUPTHER

## 2020-09-10 LAB
ANA SER QL: NEGATIVE
B BURGDOR IGG+IGM SER-ACNC: <0.91 ISR (ref 0–0.9)
ERYTHROCYTE [SEDIMENTATION RATE] IN BLOOD BY WESTERGREN METHOD: 2 MM/HR (ref 0–40)
RHEUMATOID FACT SERPL-ACNC: 10.1 IU/ML (ref 0–13.9)

## 2020-11-03 ENCOUNTER — TRANSCRIBE ORDERS (OUTPATIENT)
Dept: ADMINISTRATIVE | Facility: HOSPITAL | Age: 55
End: 2020-11-03

## 2020-11-03 DIAGNOSIS — Z12.31 ENCOUNTER FOR SCREENING MAMMOGRAM FOR MALIGNANT NEOPLASM OF BREAST: Primary | ICD-10-CM

## 2020-12-07 ENCOUNTER — OFFICE VISIT (OUTPATIENT)
Dept: FAMILY MEDICINE CLINIC | Facility: CLINIC | Age: 55
End: 2020-12-07
Payer: COMMERCIAL

## 2020-12-07 VITALS
WEIGHT: 131 LBS | HEIGHT: 63 IN | TEMPERATURE: 98.5 F | BODY MASS INDEX: 23.21 KG/M2 | SYSTOLIC BLOOD PRESSURE: 120 MMHG | HEART RATE: 85 BPM | OXYGEN SATURATION: 97 % | DIASTOLIC BLOOD PRESSURE: 76 MMHG | RESPIRATION RATE: 16 BRPM

## 2020-12-07 DIAGNOSIS — Z12.4 SCREENING FOR CERVICAL CANCER: ICD-10-CM

## 2020-12-07 DIAGNOSIS — Z11.4 SCREENING FOR HIV (HUMAN IMMUNODEFICIENCY VIRUS): ICD-10-CM

## 2020-12-07 DIAGNOSIS — Z12.31 ENCOUNTER FOR SCREENING MAMMOGRAM FOR BREAST CANCER: ICD-10-CM

## 2020-12-07 DIAGNOSIS — Z00.00 ANNUAL PHYSICAL EXAM: Primary | ICD-10-CM

## 2020-12-07 DIAGNOSIS — Z83.3 FAMILY HISTORY OF DIABETES MELLITUS: ICD-10-CM

## 2020-12-07 DIAGNOSIS — G43.919 INTRACTABLE MIGRAINE WITHOUT STATUS MIGRAINOSUS, UNSPECIFIED MIGRAINE TYPE: ICD-10-CM

## 2020-12-07 DIAGNOSIS — R00.2 PALPITATIONS: ICD-10-CM

## 2020-12-07 PROCEDURE — 3725F SCREEN DEPRESSION PERFORMED: CPT | Performed by: FAMILY MEDICINE

## 2020-12-07 PROCEDURE — 1036F TOBACCO NON-USER: CPT | Performed by: FAMILY MEDICINE

## 2020-12-07 PROCEDURE — 3008F BODY MASS INDEX DOCD: CPT | Performed by: FAMILY MEDICINE

## 2020-12-07 PROCEDURE — 99396 PREV VISIT EST AGE 40-64: CPT | Performed by: FAMILY MEDICINE

## 2020-12-07 RX ORDER — SUMATRIPTAN 50 MG/1
50 TABLET, FILM COATED ORAL ONCE AS NEEDED
Qty: 15 TABLET | Refills: 6 | Status: SHIPPED | OUTPATIENT
Start: 2020-12-07 | End: 2021-05-04 | Stop reason: SDUPTHER

## 2020-12-07 RX ORDER — ATORVASTATIN CALCIUM 20 MG/1
TABLET, FILM COATED ORAL
COMMUNITY

## 2020-12-15 LAB
ALBUMIN SERPL-MCNC: 4.6 G/DL (ref 3.8–4.9)
ALBUMIN/GLOB SERPL: 2.1 {RATIO} (ref 1.2–2.2)
ALP SERPL-CCNC: 62 IU/L (ref 39–117)
ALT SERPL-CCNC: 18 IU/L (ref 0–32)
AST SERPL-CCNC: 24 IU/L (ref 0–40)
BASOPHILS # BLD AUTO: 0 X10E3/UL (ref 0–0.2)
BASOPHILS NFR BLD AUTO: 1 %
BILIRUB SERPL-MCNC: 0.4 MG/DL (ref 0–1.2)
BUN SERPL-MCNC: 12 MG/DL (ref 6–24)
BUN/CREAT SERPL: 16 (ref 9–23)
CALCIUM SERPL-MCNC: 9.6 MG/DL (ref 8.7–10.2)
CHLORIDE SERPL-SCNC: 104 MMOL/L (ref 96–106)
CO2 SERPL-SCNC: 23 MMOL/L (ref 20–29)
CREAT SERPL-MCNC: 0.73 MG/DL (ref 0.57–1)
EOSINOPHIL # BLD AUTO: 0.1 X10E3/UL (ref 0–0.4)
EOSINOPHIL NFR BLD AUTO: 1 %
ERYTHROCYTE [DISTWIDTH] IN BLOOD BY AUTOMATED COUNT: 12.1 % (ref 11.7–15.4)
EST. AVERAGE GLUCOSE BLD GHB EST-MCNC: 111 MG/DL
GLOBULIN SER-MCNC: 2.2 G/DL (ref 1.5–4.5)
GLUCOSE SERPL-MCNC: 91 MG/DL (ref 65–99)
HBA1C MFR BLD: 5.5 % (ref 4.8–5.6)
HCT VFR BLD AUTO: 39.3 % (ref 34–46.6)
HGB BLD-MCNC: 13.2 G/DL (ref 11.1–15.9)
IMM GRANULOCYTES # BLD: 0 X10E3/UL (ref 0–0.1)
IMM GRANULOCYTES NFR BLD: 0 %
LYMPHOCYTES # BLD AUTO: 1.9 X10E3/UL (ref 0.7–3.1)
LYMPHOCYTES NFR BLD AUTO: 35 %
MCH RBC QN AUTO: 30.2 PG (ref 26.6–33)
MCHC RBC AUTO-ENTMCNC: 33.6 G/DL (ref 31.5–35.7)
MCV RBC AUTO: 90 FL (ref 79–97)
MONOCYTES # BLD AUTO: 0.4 X10E3/UL (ref 0.1–0.9)
MONOCYTES NFR BLD AUTO: 8 %
NEUTROPHILS # BLD AUTO: 3 X10E3/UL (ref 1.4–7)
NEUTROPHILS NFR BLD AUTO: 55 %
PLATELET # BLD AUTO: 205 X10E3/UL (ref 150–450)
POTASSIUM SERPL-SCNC: 4 MMOL/L (ref 3.5–5.2)
PROT SERPL-MCNC: 6.8 G/DL (ref 6–8.5)
RBC # BLD AUTO: 4.37 X10E6/UL (ref 3.77–5.28)
SL AMB EGFR AFRICAN AMERICAN: 109 ML/MIN/1.73
SL AMB EGFR NON AFRICAN AMERICAN: 94 ML/MIN/1.73
SODIUM SERPL-SCNC: 143 MMOL/L (ref 134–144)
TSH SERPL DL<=0.005 MIU/L-ACNC: 1.42 UIU/ML (ref 0.45–4.5)
WBC # BLD AUTO: 5.4 X10E3/UL (ref 3.4–10.8)

## 2020-12-16 ENCOUNTER — TRANSCRIBE ORDERS (OUTPATIENT)
Dept: ADMINISTRATIVE | Facility: HOSPITAL | Age: 55
End: 2020-12-16

## 2020-12-18 ENCOUNTER — TELEPHONE (OUTPATIENT)
Dept: FAMILY MEDICINE CLINIC | Facility: CLINIC | Age: 55
End: 2020-12-18

## 2020-12-22 ENCOUNTER — HOSPITAL ENCOUNTER (OUTPATIENT)
Dept: RADIOLOGY | Facility: HOSPITAL | Age: 55
Discharge: HOME | End: 2020-12-22
Attending: OBSTETRICS & GYNECOLOGY
Payer: COMMERCIAL

## 2020-12-22 VITALS — BODY MASS INDEX: 23.04 KG/M2 | HEIGHT: 63 IN | WEIGHT: 130 LBS

## 2020-12-22 DIAGNOSIS — Z12.31 ENCOUNTER FOR SCREENING MAMMOGRAM FOR MALIGNANT NEOPLASM OF BREAST: ICD-10-CM

## 2020-12-22 PROCEDURE — 77063 BREAST TOMOSYNTHESIS BI: CPT

## 2020-12-22 PROCEDURE — 77067 SCR MAMMO BI INCL CAD: CPT

## 2021-01-27 ENCOUNTER — APPOINTMENT (OUTPATIENT)
Dept: RADIOLOGY | Facility: CLINIC | Age: 56
End: 2021-01-27
Payer: COMMERCIAL

## 2021-01-27 ENCOUNTER — OFFICE VISIT (OUTPATIENT)
Dept: OBGYN CLINIC | Facility: CLINIC | Age: 56
End: 2021-01-27
Payer: COMMERCIAL

## 2021-01-27 VITALS
SYSTOLIC BLOOD PRESSURE: 104 MMHG | BODY MASS INDEX: 23.81 KG/M2 | HEIGHT: 63 IN | WEIGHT: 134.4 LBS | HEART RATE: 86 BPM | DIASTOLIC BLOOD PRESSURE: 69 MMHG

## 2021-01-27 DIAGNOSIS — M25.511 RIGHT SHOULDER PAIN, UNSPECIFIED CHRONICITY: ICD-10-CM

## 2021-01-27 DIAGNOSIS — M75.41 SUBACROMIAL IMPINGEMENT OF RIGHT SHOULDER: Primary | ICD-10-CM

## 2021-01-27 PROCEDURE — 99214 OFFICE O/P EST MOD 30 MIN: CPT | Performed by: ORTHOPAEDIC SURGERY

## 2021-01-27 PROCEDURE — 3008F BODY MASS INDEX DOCD: CPT | Performed by: ORTHOPAEDIC SURGERY

## 2021-01-27 PROCEDURE — 1036F TOBACCO NON-USER: CPT | Performed by: ORTHOPAEDIC SURGERY

## 2021-01-27 PROCEDURE — 73030 X-RAY EXAM OF SHOULDER: CPT

## 2021-01-27 RX ORDER — MELOXICAM 15 MG/1
15 TABLET ORAL DAILY
Qty: 30 TABLET | Refills: 2 | Status: SHIPPED | OUTPATIENT
Start: 2021-01-27 | End: 2022-01-28 | Stop reason: ALTCHOICE

## 2021-01-27 NOTE — PROGRESS NOTES
Assessment/Plan:  1  Subacromial impingement of right shoulder  XR shoulder 2+ vw right    meloxicam (MOBIC) 15 mg tablet       Scribe Attestation    I,:  Baljeet Rosario am acting as a scribe while in the presence of the attending physician :       I,:  Guera Infante MD personally performed the services described in this documentation    as scribed in my presence :         X-rays of the right shoulder demonstrate no acute fracture, dislocation or osseous abnormality  After obtaining a thorough history, clinical examination and review of imaging, I do believe she is demonstrating signs and symptoms consistent with subacromial impingement to her right shoulder  I do believe we can begin treating her non operatively  I did provide her with the option of formal physical therapy, a subacromial steroid injection or a prescription for Mobic  She has elected to proceed with Mobic  A prescription was sent to her pharmacy  She was advised to ice her shoulder daily after work for pain and swelling control  She will follow up in 6 weeks for repeat clinical evaluation  Subjective:   Zi Alva is a 50 y o  female who presents to the office today for initial evaluation of right shoulder pain  She states she has been experiencing intermittent pain about her right shoulder over the last 2 months  She localizes majority of her discomfort along the superior aspect of her shoulder  She describes her pain as activity related, achy, sore throbbing and mild in intensity  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, 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  She states she has been using ibuprofen and icy Hot for pain relief  She notes attempts at overhead activities exacerbate her symptoms  She denies any radicular symptoms  She denies any numbness and tingling  She denies any injury to her right shoulder  Review of Systems   Constitutional: Positive for activity change   Negative for chills, fever and unexpected weight change  HENT: Negative for hearing loss, nosebleeds and sore throat  Eyes: Negative for pain, redness and visual disturbance  Respiratory: Negative for cough, shortness of breath and wheezing  Cardiovascular: Negative for chest pain, palpitations and leg swelling  Gastrointestinal: Negative for abdominal pain, nausea and vomiting  Endocrine: Negative for polydipsia and polyuria  Genitourinary: Negative for dysuria and hematuria  Musculoskeletal:        See HPI   Skin: Negative for rash and wound  Neurological: Negative for dizziness, numbness and headaches  Psychiatric/Behavioral: Negative for decreased concentration and suicidal ideas  The patient is not nervous/anxious            Past Medical History:   Diagnosis Date    Allergic rhinitis     History of screening mammography     last assessed: 10/28/2013    Hyperlipidemia     Left lumbar radiculopathy     last assessed: 08/02/2017    Mitral valve regurgitation     trace, by prior echocardiogram    Tricuspid regurgitation     trace, by prior echocardiogram    Vagina bleeding     last assessed: 12/08/2014       Past Surgical History:   Procedure Laterality Date    EPIDURAL BLOCK INJECTION Right 1/18/2018    Procedure: TRANSFORAMINAL L5-S1;  Surgeon: Gill Chi MD;  [facility]: [facility] MAIN OR;  Service: Pain Management     HYSTERECTOMY  2014       Family History   Problem Relation Age of Onset    Heart disease Family     Hypertension Family     Thyroid disease Family     Melanoma Other         malignant, of the skin    Hypertension Mother     Hyperlipidemia Mother     Heart disease Mother     Hypertension Sister     Hyperlipidemia Sister     No Known Problems Son     Leukemia Maternal Aunt        Social History     Occupational History    Not on file   Tobacco Use    Smoking status: Never Smoker    Smokeless tobacco: Never Used   Substance and Sexual Activity    Alcohol use: No     Comment: (Occasional alcohol use-as per [employer])    Drug use: No    Sexual activity: Not on file         Current Outpatient Medications:     Ascorbic Acid (VITAMIN C) 100 MG tablet, Take 100 mg by mouth daily, Disp: , Rfl:     cetirizine (ZyrTEC) 10 mg tablet, Take 1 tablet (10 mg total) by mouth daily, Disp: 30 tablet, Rfl: 0    Cholecalciferol 1000 units capsule, Take 1,000 Units by mouth daily, Disp: , Rfl:     EVENING PRIMROSE OIL PO, CAPS, Disp: , Rfl:     Multiple Vitamin (MULTIVITAMIN) tablet, Take 1 tablet by mouth daily, Disp: , Rfl:     SUMAtriptan (IMITREX) 50 mg tablet, Take 1 tablet (50 mg total) by mouth once as needed for migraine, Disp: 15 tablet, Rfl: 6    thiamine (VITAMIN B1) 100 mg tablet, Take 100 mg by mouth daily, Disp: , Rfl:     atorvastatin (LIPITOR) 20 mg tablet, Take 1 tablet by mouth once daily, Disp: , Rfl:     meloxicam (MOBIC) 15 mg tablet, Take 1 tablet (15 mg total) by mouth daily, Disp: 30 tablet, Rfl: 2    Allergies   Allergen Reactions    Penicillins      As a child unsure of reaction       Objective:  Vitals:    01/27/21 1624   BP: 104/69   Pulse: 86       Right Shoulder Exam     Tenderness   The patient is experiencing no tenderness  Range of Motion   Active abduction: 160 (discomfort at end range )   External rotation: 70   Forward flexion: 170   Internal rotation 0 degrees: Sacrum     Muscle Strength   Right shoulder normal muscle strength: Mild discomfort with abduction  Abduction: 5/5   Internal rotation: 5/5   External rotation: 5/5     Tests   Perea test: positive  Impingement: positive    Other   Erythema: absent  Scars: absent  Sensation: normal  Pulse: present    Comments:    Empty Can (-)            Physical Exam  Vitals signs reviewed  Constitutional:       Appearance: Normal appearance  She is well-developed  HENT:      Head: Normocephalic and atraumatic  Eyes:      General:         Right eye: No discharge  Left eye: No discharge  Conjunctiva/sclera: Conjunctivae normal       Pupils: Pupils are equal, round, and reactive to light  Neck:      Musculoskeletal: Normal range of motion and neck supple  Cardiovascular:      Rate and Rhythm: Normal rate  Pulmonary:      Effort: Pulmonary effort is normal  No respiratory distress  Musculoskeletal:      Comments: As noted in HPI  Skin:     General: Skin is warm and dry  Neurological:      General: No focal deficit present  Mental Status: She is alert and oriented to person, place, and time  Psychiatric:         Mood and Affect: Mood normal          Behavior: Behavior normal          I have personally reviewed pertinent films in PACS and my interpretation is as follows:  X-rays of the right shoulder obtained on 01/27/2021 demonstrate no acute fracture, dislocation or osseous abnormality

## 2021-02-04 ENCOUNTER — TELEPHONE (OUTPATIENT)
Dept: FAMILY MEDICINE CLINIC | Facility: CLINIC | Age: 56
End: 2021-02-04

## 2021-02-04 NOTE — TELEPHONE ENCOUNTER
Left patient a VM asking to please call back to set up an apt  If patient calls back please refer to message below  Thank you!       ----- Message from Perry Tam MD sent at 2/4/2021  9:19 AM EST -----  Regarding: Need gyn/pap smear  PLease call to schedule pt with PCP  Please contact patient to schedule an gyn exam with their PCP ([facility] Jayden Zelayavard)  (or their team colleagues)  Also ensure their e-mail is listed and that they are signed up for [facility]   Thanks, Dr Kristy Zaragoza

## 2021-03-10 ENCOUNTER — OFFICE VISIT (OUTPATIENT)
Dept: OBGYN CLINIC | Facility: CLINIC | Age: 56
End: 2021-03-10
Payer: COMMERCIAL

## 2021-03-10 VITALS
SYSTOLIC BLOOD PRESSURE: 145 MMHG | HEART RATE: 76 BPM | DIASTOLIC BLOOD PRESSURE: 80 MMHG | HEIGHT: 63 IN | BODY MASS INDEX: 23.88 KG/M2 | WEIGHT: 134.8 LBS

## 2021-03-10 DIAGNOSIS — M75.01 ADHESIVE CAPSULITIS OF RIGHT SHOULDER: Primary | ICD-10-CM

## 2021-03-10 PROCEDURE — 1036F TOBACCO NON-USER: CPT | Performed by: ORTHOPAEDIC SURGERY

## 2021-03-10 PROCEDURE — 3008F BODY MASS INDEX DOCD: CPT | Performed by: ORTHOPAEDIC SURGERY

## 2021-03-10 PROCEDURE — 99214 OFFICE O/P EST MOD 30 MIN: CPT | Performed by: ORTHOPAEDIC SURGERY

## 2021-03-10 PROCEDURE — 20610 DRAIN/INJ JOINT/BURSA W/O US: CPT | Performed by: ORTHOPAEDIC SURGERY

## 2021-03-10 RX ORDER — FLUTICASONE PROPIONATE 50 MCG
SPRAY, SUSPENSION (ML) NASAL
COMMUNITY
Start: 2021-03-06

## 2021-03-10 RX ORDER — BUPIVACAINE HYDROCHLORIDE 2.5 MG/ML
4 INJECTION, SOLUTION INFILTRATION; PERINEURAL
Status: COMPLETED | OUTPATIENT
Start: 2021-03-10 | End: 2021-03-10

## 2021-03-10 RX ORDER — DEXAMETHASONE SODIUM PHOSPHATE 100 MG/10ML
40 INJECTION INTRAMUSCULAR; INTRAVENOUS
Status: COMPLETED | OUTPATIENT
Start: 2021-03-10 | End: 2021-03-10

## 2021-03-10 RX ADMIN — BUPIVACAINE HYDROCHLORIDE 4 ML: 2.5 INJECTION, SOLUTION INFILTRATION; PERINEURAL at 17:11

## 2021-03-10 RX ADMIN — DEXAMETHASONE SODIUM PHOSPHATE 40 MG: 100 INJECTION INTRAMUSCULAR; INTRAVENOUS at 17:11

## 2021-03-10 NOTE — PATIENT INSTRUCTIONS
Exercises for Shoulder Abduction and Adduction   AMBULATORY CARE:   Shoulder abduction and adduction exercises  work the muscles at the back of your shoulder and your upper back  Before you exercise:  Warm up and stretch before you exercise  Walk or ride a stationary bike for 5 to 10 minutes to help you warm up  Stretching helps increase range of motion  It may also decrease muscle soreness and help prevent another injury  Your healthcare provider will tell you which of the following stretches to do:  · Crossover arm stretch:  Relax your shoulders  Hold your upper arm with the opposite hand  Pull your arm across your chest until you feel a stretch  Hold the stretch for 30 seconds  Return to the starting position  · Shoulder flexion stretch:  Stand facing a wall  Slowly walk your fingers up the wall until you feel a stretch  Hold the stretch for 30 seconds  Return to the starting position  · Sleeper stretch:  Lie on your injured side on a firm, flat surface  Bend the elbow of your injured arm 90° with your hand facing up  Use your arm that is not injured to slowly push your injured arm down  Stop when you feel a stretch at the back of your injured shoulder  Hold the stretch for 30 seconds  Slowly return to the starting position  Contact your healthcare provider if:   · You have sharp or worsening pain during exercise or at rest     · You have questions or concerns about your shoulder exercises  How to exercise with a weight:  Your healthcare provider will tell you how much weight to use  · Shoulder abduction:  Stand and hold a weight in your hand with your palm facing your body  Slowly raise your arm to the side with your thumb pointing up  Then raise your arm over your head as far as you can without pain  Hold this position for as long as directed  Do not raise your arm over your head unless your healthcare provider says it is okay            · Shoulder adduction:  Lie on your back on a firm surface  Extend your arm out to a "T " Bend your elbow so your forearm in the air  Hold a weight in your hand  Slowly raise your arm toward the ceiling and straighten your elbow  Hold this position for as long as directed  Slowly return to the starting position  How to exercise with an exercise band:   · Shoulder abduction:  Wrap the exercise band around a heavy, stable object near your foot  Grab the band with the hand of your injured shoulder  Keep your arm straight  Slowly raise your arm to the side with your thumb pointing up  Then, slowly pull the band over your head as far as you can without pain  Do not raise your arm over your head unless your healthcare provider says it is okay  Do not let your shoulder shrug  Hold this position for as long as directed  Slowly return to the starting position  · Shoulder adduction:  Wrap the exercise band around a heavy, stable object  Stand and face away from where the band is anchored  Hold each end of the band in both hands with your elbows bent  Your elbows should not be behind your body  Keep your arms parallel to the floor and slowly straighten your elbows  Hold this position for as long as directed  Slowly return to the starting position  Follow up with your physical therapist as directed:  Write down your questions so you remember to ask them at your visits  © Copyright [address] Information is for End User's use only and may not be sold, redistributed or otherwise used for commercial purposes  All illustrations and images included in CareNotes® are the copyrighted property of [employer] D [employer] M [employer] Inc  or [facility] Jeaneth Mercer   The above information is an  only  It is not intended as medical advice for individual conditions or treatments  Talk to your doctor, nurse or pharmacist before following any medical regimen to see if it is safe and effective for you    Exercises for Internal and External Shoulder Rotation   WHAT YOU NEED TO KNOW:   Exercises for internal shoulder rotation work the muscles in your chest and front of your shoulder  Exercises for external shoulder rotation work the muscles in the back of your shoulder and upper back  DISCHARGE INSTRUCTIONS:   Contact your healthcare provider if:   · You have sharp or worsening pain during exercise or at rest     · You have questions or concerns about your shoulder exercises  Before you exercise:  Warm up and stretch before you exercise  Walk or ride a stationary bike for 5 to 10 minutes to help you warm up  Stretching helps increase range of motion  It may also decrease muscle soreness and help prevent another injury  Your healthcare provider will tell you which of the following stretches to do:  · Crossover arm stretch:  Relax your shoulders  Hold your upper arm with the opposite hand  Pull your arm across your chest until you feel a stretch  Hold the stretch for 30 seconds  Return to the starting position  · Shoulder flexion stretch:  Stand facing a wall  Slowly walk your fingers up the wall until you feel a stretch  Hold the stretch for 30 seconds  Return to the starting position  · Sleeper stretch:  Lie on your injured side on a firm, flat surface  Bend the elbow of your injured arm 90° with your hand facing up  Use your arm that is not injured to slowly push your injured arm down  Stop when you feel a stretch at the back of your injured shoulder  Hold the stretch for 30 seconds  Slowly return to the starting position  How to exercise with a weight:  Your healthcare provider will tell you how much weight to exercise with  · Shoulder internal rotation:  Sit in a chair  Place a rolled up towel between your elbow and your side  Bend your elbow to 90°  Gently squeeze the towel with your elbow to prevent it from falling out  Hold the weight with your thumb pointing up  Slowly move the weight across your chest  Stop when your hand reaches your opposite arm  Hold this position for as many seconds as directed  Slowly return to the starting position  · Shoulder external rotation:  Lie on your side with your injured shoulder facing up  Bend your elbow 90°  Place a rolled up towel between your elbow and your side  Hold a weight in your hand  Gently squeeze the towel with your elbow to prevent it from falling out  Slowly rotate your arm outward, but keep your elbow bent  Stop when you feel a stretch  Hold this position for 30 seconds or as directed  Slowly return to the starting position  How to exercise with an exercise band:   · Shoulder internal rotation:  Tie one end of the exercise band to a heavy, secure object  Sit in a chair  Place a rolled up towel between your elbow and your side  Bend your elbow to 90°  Gently squeeze the towel with your elbow to prevent it from falling out  Slowly pull the band across your chest  Stop when your hand reaches your opposite arm  Hold this position for as many seconds as directed  Slowly return to the starting position  · Shoulder external rotation:  Hold one end of the exercise band on the side that is not injured  Place a rolled up towel between your elbow and your side  Bend your elbow 90°  Squeeze the towel with your elbow  Grab the end of the band and slowly turn your arm outward, but keep your elbow bent  Stop when you feel a stretch  Hold this position for 30 seconds or as directed  Slowly return to the starting position  Follow up with your physical therapist as directed:  Write down your questions so you remember to ask them at your visits  © Copyright [address] Information is for End User's use only and may not be sold, redistributed or otherwise used for commercial purposes  All illustrations and images included in CareNotes® are the copyrighted property of [employer]  or [facility] Jeaneth Mercer   The above information is an  only   It is not intended as medical advice for individual conditions or treatments  Talk to your doctor, nurse or pharmacist before following any medical regimen to see if it is safe and effective for you

## 2021-03-10 NOTE — PROGRESS NOTES
Assessment/Plan:  1  Adhesive capsulitis of right shoulder         Scribe Attestation    I,:  Philly Santos am acting as a scribe while in the presence of the attending physician :       I,:  Guera Infante MD personally performed the services described in this documentation    as scribed in my presence :         Fang Sorto upon examination does demonstrate signs and symptoms concerning for  worsening adhesive capsulitis  She does demonstrate some stiffness into the shoulder with internal rotation  However her forward flexion and external rotation are within normal limits  I do believe her rotator cuff is intact the does demonstrate appropriate strength today  I did discuss non operative treatment for her with continuation of Mobic, physical therapy, a physician directed exercise program, and a steroid injection  She did opt for a steroid injection as well as a physician directed exercise program   She did tolerate the injection well with no complications  She is to perform the home exercises each day over the next 4 weeks  After 4 weeks should she fail to see improvements with her shoulder function she may follow up with me in at that time we may consider ordering an MRI to question internal derangement  Large joint arthrocentesis: R glenohumeral  [facility] Protocol:  Consent: Verbal consent obtained    Risks and benefits: risks, benefits and alternatives were discussed  Consent given by: patient  Timeout called at: 3/10/2021 5:07 PM   Patient understanding: patient states understanding of the procedure being performed  Site marked: the operative site was marked  Supporting Documentation  Indications: pain   Procedure Details  Location: shoulder - R glenohumeral  Needle size: 22 G  Ultrasound guidance: no  Approach: anterior  Medications administered: 4 mL bupivacaine 0 25 %; 40 mg dexamethasone 100 mg/10 mL    Patient tolerance: patient tolerated the procedure well with no immediate complications  Dressing:  Sterile dressing applied            Subjective:   Stevo Bray is a 50 y o  female who presents to the office today for  Follow-up evaluation of her right shoulder  She states that she is feeling better overall compared to her previous appointment in January 2020  She notes that the Mobic prescribed to her was beneficial in provide her with symptomatic relief  However she notes that when she puts her arm in an abducted position with internal rotation she will experience intermittent and mild to moderate sharp pain to the anterior aspect of her shoulder  She does experience intermittent clicking of the shoulder that is nonpainful  She remarks that her range of motion is intact overall however does have some difficulty reaching behind her back due to the pain the anterior aspect of her shoulder  She denies any radiating pain or paresthesias into the right upper extremity  Today she denies any distal paresthesias  Review of Systems   Constitutional: Negative for chills, fever and unexpected weight change  HENT: Negative for hearing loss, nosebleeds and sore throat  Eyes: Negative for pain, redness and visual disturbance  Respiratory: Negative for cough, shortness of breath and wheezing  Cardiovascular: Negative for chest pain, palpitations and leg swelling  Gastrointestinal: Negative for abdominal pain, nausea and vomiting  Endocrine: Negative for polydipsia and polyuria  Genitourinary: Negative for dysuria and hematuria  Musculoskeletal: Positive for arthralgias and myalgias  See HPI   Skin: Negative for rash and wound  Neurological: Negative for dizziness, numbness and headaches  Psychiatric/Behavioral: Negative for decreased concentration and suicidal ideas  The patient is not nervous/anxious            Past Medical History:   Diagnosis Date    Allergic rhinitis     History of screening mammography     last assessed: 10/28/2013    Hyperlipidemia  Left lumbar radiculopathy     last assessed: 08/02/2017    Mitral valve regurgitation     trace, by prior echocardiogram    Tricuspid regurgitation     trace, by prior echocardiogram    Vagina bleeding     last assessed: 12/08/2014       Past Surgical History:   Procedure Laterality Date    EPIDURAL BLOCK INJECTION Right 1/18/2018    Procedure: TRANSFORAMINAL L5-S1;  Surgeon: Jania Laguna MD;  Location: [facility] [place] [facility];  Service: Pain Management     HYSTERECTOMY  2014       Family History   Problem Relation Age of Onset    Heart disease Family     Hypertension Family     Thyroid disease Family     Melanoma Other         malignant, of the skin    Hypertension Mother     Hyperlipidemia Mother     Heart disease Mother     Hypertension Sister     Hyperlipidemia Sister     No Known Problems Son     Leukemia Maternal Aunt        Social History     Occupational History    Not on file   Tobacco Use    Smoking status: Never Smoker    Smokeless tobacco: Never Used   Substance and Sexual Activity    Alcohol use: No     Comment: (Occasional alcohol use-as per [employer])    Drug use: No    Sexual activity: Not on file         Current Outpatient Medications:     Ascorbic Acid (VITAMIN C) 100 MG tablet, Take 100 mg by mouth daily, Disp: , Rfl:     atorvastatin (LIPITOR) 20 mg tablet, Take 1 tablet by mouth once daily, Disp: , Rfl:     cetirizine (ZyrTEC) 10 mg tablet, Take 1 tablet (10 mg total) by mouth daily, Disp: 30 tablet, Rfl: 0    Cholecalciferol 1000 units capsule, Take 1,000 Units by mouth daily, Disp: , Rfl:     EVENING PRIMROSE OIL PO, CAPS, Disp: , Rfl:     fluticasone (FLONASE) 50 mcg/act nasal spray, USE 1 SPRAY(S) IN EACH NOSTRIL TWICE DAILY, Disp: , Rfl:     meloxicam (MOBIC) 15 mg tablet, Take 1 tablet (15 mg total) by mouth daily, Disp: 30 tablet, Rfl: 2    Multiple Vitamin (MULTIVITAMIN) tablet, Take 1 tablet by mouth daily, Disp: , Rfl:     SUMAtriptan (IMITREX) 50 mg tablet, Take 1 tablet (50 mg total) by mouth once as needed for migraine, Disp: 15 tablet, Rfl: 6    thiamine (VITAMIN B1) 100 mg tablet, Take 100 mg by mouth daily, Disp: , Rfl:     Allergies   Allergen Reactions    Penicillins      As a child unsure of reaction       Objective:  Vitals:    03/10/21 1617   BP: 145/80   Pulse: 76       Right Shoulder Exam     Tenderness   The patient is experiencing tenderness in the biceps tendon  Range of Motion   Active abduction: 150   External rotation: 60   Internal rotation 0 degrees: Sacrum   Internal rotation 90 degrees: 30     Muscle Strength   Abduction: 5/5   Internal rotation: 5/5   External rotation: 5/5   Biceps: 5/5     Tests   Perea test: positive  Impingement: positive    Other   Erythema: absent  Scars: absent  Sensation: normal  Pulse: present            Physical Exam  Vitals signs reviewed  HENT:      Head: Normocephalic and atraumatic  Eyes:      General:         Right eye: No discharge  Left eye: No discharge  Conjunctiva/sclera: Conjunctivae normal       Pupils: Pupils are equal, round, and reactive to light  Neck:      Musculoskeletal: Normal range of motion and neck supple  Cardiovascular:      Rate and Rhythm: Normal rate  Pulmonary:      Effort: Pulmonary effort is normal  No respiratory distress  Skin:     General: Skin is warm and dry  Neurological:      Mental Status: She is alert and oriented to person, place, and time

## 2021-05-03 ENCOUNTER — TELEPHONE (OUTPATIENT)
Dept: FAMILY MEDICINE CLINIC | Facility: CLINIC | Age: 56
End: 2021-05-03

## 2021-05-04 DIAGNOSIS — G43.919 INTRACTABLE MIGRAINE WITHOUT STATUS MIGRAINOSUS, UNSPECIFIED MIGRAINE TYPE: ICD-10-CM

## 2021-05-04 RX ORDER — SUMATRIPTAN 50 MG/1
50 TABLET, FILM COATED ORAL ONCE AS NEEDED
Qty: 15 TABLET | Refills: 6 | Status: SHIPPED | OUTPATIENT
Start: 2021-05-04 | End: 2022-07-18 | Stop reason: SDUPTHER

## 2021-05-07 ENCOUNTER — OFFICE VISIT (OUTPATIENT)
Dept: OBGYN CLINIC | Facility: CLINIC | Age: 56
End: 2021-05-07
Payer: COMMERCIAL

## 2021-05-07 VITALS
HEIGHT: 63 IN | BODY MASS INDEX: 23.78 KG/M2 | HEART RATE: 73 BPM | WEIGHT: 134.2 LBS | SYSTOLIC BLOOD PRESSURE: 114 MMHG | DIASTOLIC BLOOD PRESSURE: 73 MMHG

## 2021-05-07 DIAGNOSIS — M75.41 SUBACROMIAL IMPINGEMENT OF RIGHT SHOULDER: ICD-10-CM

## 2021-05-07 DIAGNOSIS — M75.01 ADHESIVE CAPSULITIS OF RIGHT SHOULDER: Primary | ICD-10-CM

## 2021-05-07 PROCEDURE — 99214 OFFICE O/P EST MOD 30 MIN: CPT | Performed by: ORTHOPAEDIC SURGERY

## 2021-05-07 RX ORDER — AZELASTINE HYDROCHLORIDE 137 UG/1
SPRAY, METERED NASAL
COMMUNITY
Start: 2021-04-24

## 2021-05-07 NOTE — PROGRESS NOTES
Assessment/Plan:  1  Adhesive capsulitis of right shoulder     2  Subacromial impingement of right shoulder         The patient is still struggling despite conservative treatment thus far  As previously discussed, I am ordering an MRI to rule out RTC tear  She will follow-up after her MRI to discuss the results and how to proceed  She can continue exercises in the meantime  Subjective:   Dot Sorto is a 50 y o  female who presents today for follow-up of her right shoulder  We have been treating her conservatively for suspected adhesive capsulitis of there shoulder  She was given a steroid injection last visit and she has been doing her home exercise program  She notes some improved ROM of the shoulder, but notes ongoing pain and weakness  She notes good sensation of the right upper extremity  Review of Systems   Constitutional: Negative  Negative for chills and fever  HENT: Negative  Negative for ear pain and sore throat  Eyes: Negative  Negative for pain and redness  Respiratory: Negative  Negative for shortness of breath and wheezing  Cardiovascular: Negative for chest pain and palpitations  Gastrointestinal: Negative  Negative for abdominal pain and blood in stool  Endocrine: Negative  Negative for polydipsia and polyuria  Genitourinary: Negative  Negative for difficulty urinating and dysuria  Musculoskeletal:        As noted in HPI   Skin: Negative  Negative for pallor and rash  Neurological: Negative  Negative for dizziness and numbness  Hematological: Negative  Negative for adenopathy  Does not bruise/bleed easily  Psychiatric/Behavioral: Negative  Negative for confusion and suicidal ideas           Past Medical History:   Diagnosis Date    Allergic rhinitis     History of screening mammography     last assessed: 10/28/2013    Hyperlipidemia     Left lumbar radiculopathy     last assessed: 08/02/2017    Mitral valve regurgitation     trace, by prior echocardiogram    Tricuspid regurgitation     trace, by prior echocardiogram    Vagina bleeding     last assessed: 12/08/2014       Past Surgical History:   Procedure Laterality Date    EPIDURAL BLOCK INJECTION Right 1/18/2018    Procedure: TRANSFORAMINAL L5-S1;  Surgeon: Vincent Klinefelter, MD;  Location: [place] [address] MAIN OR;  Service: Pain Management     HYSTERECTOMY  2014       Family History   Problem Relation Age of Onset    Heart disease Family     Hypertension Family     Thyroid disease Family     Melanoma Other         malignant, of the skin    Hypertension Mother     Hyperlipidemia Mother     Heart disease Mother     Hypertension Sister     Hyperlipidemia Sister     No Known Problems Son     Leukemia Maternal Aunt        Social History     Occupational History    Not on file   Tobacco Use    Smoking status: Never Smoker    Smokeless tobacco: Never Used   Substance and Sexual Activity    Alcohol use: No     Comment: (Occasional alcohol use-as per [employer])    Drug use: No    Sexual activity: Not on file         Current Outpatient Medications:     Ascorbic Acid (VITAMIN C) 100 MG tablet, Take 100 mg by mouth daily, Disp: , Rfl:     atorvastatin (LIPITOR) 20 mg tablet, Take 1 tablet by mouth once daily, Disp: , Rfl:     Azelastine HCl 137 MCG/SPRAY SOLN, USE 1 SPRAY(S) IN EACH NOSTRIL TWICE DAILY, Disp: , Rfl:     cetirizine (ZyrTEC) 10 mg tablet, Take 1 tablet (10 mg total) by mouth daily, Disp: 30 tablet, Rfl: 0    Cholecalciferol 1000 units capsule, Take 1,000 Units by mouth daily, Disp: , Rfl:     EVENING PRIMROSE OIL PO, CAPS, Disp: , Rfl:     fluticasone (FLONASE) 50 mcg/act nasal spray, USE 1 SPRAY(S) IN EACH NOSTRIL TWICE DAILY, Disp: , Rfl:     meloxicam (MOBIC) 15 mg tablet, Take 1 tablet (15 mg total) by mouth daily, Disp: 30 tablet, Rfl: 2    Multiple Vitamin (MULTIVITAMIN) tablet, Take 1 tablet by mouth daily, Disp: , Rfl:     SUMAtriptan (IMITREX) 50 mg tablet, Take 1 tablet (50 mg total) by mouth once as needed for migraine, Disp: 15 tablet, Rfl: 6    thiamine (VITAMIN B1) 100 mg tablet, Take 100 mg by mouth daily, Disp: , Rfl:     Allergies   Allergen Reactions    Penicillins      As a child unsure of reaction       Objective:  Vitals:    05/07/21 1519   BP: 114/73   Pulse: 73       Right Shoulder Exam     Tenderness   The patient is experiencing no tenderness  Range of Motion   Active abduction: 90   External rotation: 70   Right shoulder forward flexion: 160 passive forward flexion  Internal rotation 0 degrees: Sacrum   Internal rotation 90 degrees: 0 (with pain)     Muscle Strength   Abduction: 4/5   Internal rotation: 5/5   External rotation: 5/5     Tests   Perea test: positive  Impingement: positive    Other   Erythema: absent  Sensation: normal  Pulse: present            Physical Exam  Constitutional:       General: She is not in acute distress  Appearance: She is well-developed  HENT:      Head: Normocephalic and atraumatic  Eyes:      General: No scleral icterus  Conjunctiva/sclera: Conjunctivae normal    Neck:      Vascular: No JVD  Cardiovascular:      Rate and Rhythm: Normal rate  Pulmonary:      Effort: Pulmonary effort is normal  No respiratory distress  Skin:     General: Skin is warm  Neurological:      Mental Status: She is alert and oriented to person, place, and time        Coordination: Coordination normal

## 2021-06-04 ENCOUNTER — HOSPITAL ENCOUNTER (OUTPATIENT)
Dept: RADIOLOGY | Facility: HOSPITAL | Age: 56
Discharge: HOME | End: 2021-06-04
Payer: COMMERCIAL

## 2021-06-04 DIAGNOSIS — M75.41 SUBACROMIAL IMPINGEMENT OF RIGHT SHOULDER: ICD-10-CM

## 2021-06-04 PROCEDURE — G1004 CDSM NDSC: HCPCS

## 2021-06-04 PROCEDURE — 73221 MRI JOINT UPR EXTREM W/O DYE: CPT

## 2021-06-11 ENCOUNTER — OFFICE VISIT (OUTPATIENT)
Dept: OBGYN CLINIC | Facility: CLINIC | Age: 56
End: 2021-06-11
Payer: COMMERCIAL

## 2021-06-11 VITALS
HEART RATE: 62 BPM | WEIGHT: 132.2 LBS | HEIGHT: 63 IN | SYSTOLIC BLOOD PRESSURE: 127 MMHG | BODY MASS INDEX: 23.42 KG/M2 | DIASTOLIC BLOOD PRESSURE: 84 MMHG

## 2021-06-11 DIAGNOSIS — M75.121 COMPLETE TEAR OF RIGHT ROTATOR CUFF, UNSPECIFIED WHETHER TRAUMATIC: Primary | ICD-10-CM

## 2021-06-11 PROCEDURE — 99214 OFFICE O/P EST MOD 30 MIN: CPT | Performed by: ORTHOPAEDIC SURGERY

## 2021-06-11 PROCEDURE — 1036F TOBACCO NON-USER: CPT | Performed by: ORTHOPAEDIC SURGERY

## 2021-06-11 PROCEDURE — 3008F BODY MASS INDEX DOCD: CPT | Performed by: ORTHOPAEDIC SURGERY

## 2021-06-11 NOTE — PATIENT INSTRUCTIONS
Exercises for Shoulder Abduction and Adduction   AMBULATORY CARE:   Shoulder abduction and adduction exercises  work the muscles at the back of your shoulder and your upper back  Before you exercise:  Warm up and stretch before you exercise  Walk or ride a stationary bike for 5 to 10 minutes to help you warm up  Stretching helps increase range of motion  It may also decrease muscle soreness and help prevent another injury  Your healthcare provider will tell you which of the following stretches to do:  · Crossover arm stretch:  Relax your shoulders  Hold your upper arm with the opposite hand  Pull your arm across your chest until you feel a stretch  Hold the stretch for 30 seconds  Return to the starting position  · Shoulder flexion stretch:  Stand facing a wall  Slowly walk your fingers up the wall until you feel a stretch  Hold the stretch for 30 seconds  Return to the starting position  · Sleeper stretch:  Lie on your injured side on a firm, flat surface  Bend the elbow of your injured arm 90° with your hand facing up  Use your arm that is not injured to slowly push your injured arm down  Stop when you feel a stretch at the back of your injured shoulder  Hold the stretch for 30 seconds  Slowly return to the starting position  Contact your healthcare provider if:   · You have sharp or worsening pain during exercise or at rest     · You have questions or concerns about your shoulder exercises  How to exercise with a weight:  Your healthcare provider will tell you how much weight to use  · Shoulder abduction:  Stand and hold a weight in your hand with your palm facing your body  Slowly raise your arm to the side with your thumb pointing up  Then raise your arm over your head as far as you can without pain  Hold this position for as long as directed  Do not raise your arm over your head unless your healthcare provider says it is okay            · Shoulder adduction:  Lie on your back on a firm surface  Extend your arm out to a "T " Bend your elbow so your forearm in the air  Hold a weight in your hand  Slowly raise your arm toward the ceiling and straighten your elbow  Hold this position for as long as directed  Slowly return to the starting position  How to exercise with an exercise band:   · Shoulder abduction:  Wrap the exercise band around a heavy, stable object near your foot  Grab the band with the hand of your injured shoulder  Keep your arm straight  Slowly raise your arm to the side with your thumb pointing up  Then, slowly pull the band over your head as far as you can without pain  Do not raise your arm over your head unless your healthcare provider says it is okay  Do not let your shoulder shrug  Hold this position for as long as directed  Slowly return to the starting position  · Shoulder adduction:  Wrap the exercise band around a heavy, stable object  Stand and face away from where the band is anchored  Hold each end of the band in both hands with your elbows bent  Your elbows should not be behind your body  Keep your arms parallel to the floor and slowly straighten your elbows  Hold this position for as long as directed  Slowly return to the starting position  Follow up with your physical therapist as directed:  Write down your questions so you remember to ask them at your visits  © Copyright [address] Information is for End User's use only and may not be sold, redistributed or otherwise used for commercial purposes  All illustrations and images included in CareNotes® are the copyrighted property of A D A M [employer] Inc  or [facility] Jeaneth Mercer   The above information is an  only  It is not intended as medical advice for individual conditions or treatments  Talk to your doctor, nurse or pharmacist before following any medical regimen to see if it is safe and effective for you    Exercises for Internal and External Shoulder Rotation   AMBULATORY CARE:   Muscles worked during internal and external shoulder exercises:  Exercises for internal shoulder rotation work the muscles in your chest and front of your shoulder  Exercises for external shoulder rotation work the muscles in the back of your shoulder and upper back  Contact your healthcare provider if:   · You have sharp or worsening pain during exercise or at rest     · You have questions or concerns about your shoulder exercises  Before you exercise:  Warm up and stretch before you exercise  Walk or ride a stationary bike for 5 to 10 minutes to help you warm up  Stretching helps increase range of motion  It may also decrease muscle soreness and help prevent another injury  Your healthcare provider will tell you which of the following stretches to do:  · Crossover arm stretch:  Relax your shoulders  Hold your upper arm with the opposite hand  Pull your arm across your chest until you feel a stretch  Hold the stretch for 30 seconds  Return to the starting position  · Shoulder flexion stretch:  Stand facing a wall  Slowly walk your fingers up the wall until you feel a stretch  Hold the stretch for 30 seconds  Return to the starting position  · Sleeper stretch:  Lie on your injured side on a firm, flat surface  Bend the elbow of your injured arm 90° with your hand facing up  Use your arm that is not injured to slowly push your injured arm down  Stop when you feel a stretch at the back of your injured shoulder  Hold the stretch for 30 seconds  Slowly return to the starting position  How to exercise with a weight:  Your healthcare provider will tell you how much weight to exercise with  · Shoulder internal rotation:  Sit in a chair  Place a rolled up towel between your elbow and your side  Bend your elbow to 90°  Gently squeeze the towel with your elbow to prevent it from falling out  Hold the weight with your thumb pointing up   Slowly move the weight across your chest  Stop when your hand reaches your opposite arm  Hold this position for as many seconds as directed  Slowly return to the starting position  · Shoulder external rotation:  Lie on your side with your injured shoulder facing up  Bend your elbow 90°  Place a rolled up towel between your elbow and your side  Hold a weight in your hand  Gently squeeze the towel with your elbow to prevent it from falling out  Slowly rotate your arm outward, but keep your elbow bent  Stop when you feel a stretch  Hold this position for 30 seconds or as directed  Slowly return to the starting position  How to exercise with an exercise band:   · Shoulder internal rotation:  Tie one end of the exercise band to a heavy, secure object  Sit in a chair  Place a rolled up towel between your elbow and your side  Bend your elbow to 90°  Gently squeeze the towel with your elbow to prevent it from falling out  Slowly pull the band across your chest  Stop when your hand reaches your opposite arm  Hold this position for as many seconds as directed  Slowly return to the starting position  · Shoulder external rotation:  Hold one end of the exercise band on the side that is not injured  Place a rolled up towel between your elbow and your side  Bend your elbow 90°  Squeeze the towel with your elbow  Grab the end of the band and slowly turn your arm outward, but keep your elbow bent  Stop when you feel a stretch  Hold this position for 30 seconds or as directed  Slowly return to the starting position  Follow up with your physical therapist as directed:  Write down your questions so you remember to ask them at your visits  © Copyright [address] Information is for End User's use only and may not be sold, redistributed or otherwise used for commercial purposes  All illustrations and images included in CareNotes® are the copyrighted property of [employer]  or [facility] Jeaneth Mercer   The above information is an  only   It is not intended as medical advice for individual conditions or treatments  Talk to your doctor, nurse or pharmacist before following any medical regimen to see if it is safe and effective for you

## 2021-06-11 NOTE — PROGRESS NOTES
Assessment/Plan:  1  Complete tear of right rotator cuff, unspecified whether traumatic       The patient does have a full thickness rotator cuff tear  We did discuss surgical intervention, as this will likely get larger over time  The patient feels she can live with her pain at this point, and is not interested in surgery at this time  We did offer for formal PT, but she did not wish to do this either  She informed us that if her pain is getting worse she will follow-up to discuss surgery  We did provide her with home exercises today  She can ice and take OTC medications as needed for pain  She will follow-up as needed  Subjective:   Johanne Gage is a 50 y o  female who presents today for follow-up or her right shoulder  She did have an MRI which showed a full thickness anterior leading edge supraspinatus tendon tear  She notes diffuse pain about the shoulder, which bothers her mostly with working and after work  Rest helps  She note some limitations in ROM, especially with internal rotation  She still notes good strength about the shoulder  She denies any paresthesias of the right upper extremity  Review of Systems   Constitutional: Negative  Negative for chills and fever  HENT: Negative  Negative for ear pain and sore throat  Eyes: Negative  Negative for pain and redness  Respiratory: Negative  Negative for shortness of breath and wheezing  Cardiovascular: Negative for chest pain and palpitations  Gastrointestinal: Negative  Negative for abdominal pain and blood in stool  Endocrine: Negative  Negative for polydipsia and polyuria  Genitourinary: Negative  Negative for difficulty urinating and dysuria  Musculoskeletal:        As noted in HPI   Skin: Negative  Negative for pallor and rash  Neurological: Negative  Negative for dizziness and numbness  Hematological: Negative  Negative for adenopathy  Does not bruise/bleed easily  Psychiatric/Behavioral: Negative  Negative for confusion and suicidal ideas           Past Medical History:   Diagnosis Date    Allergic rhinitis     History of screening mammography     last assessed: 10/28/2013    Hyperlipidemia     Left lumbar radiculopathy     last assessed: 08/02/2017    Mitral valve regurgitation     trace, by prior echocardiogram    Tricuspid regurgitation     trace, by prior echocardiogram    Vagina bleeding     last assessed: 12/08/2014       Past Surgical History:   Procedure Laterality Date    EPIDURAL BLOCK INJECTION Right 1/18/2018    Procedure: TRANSFORAMINAL L5-S1;  Surgeon: Darek Huber MD;  Location: [facility] MAIN OR;  Service: Pain Management     HYSTERECTOMY  2014       Family History   Problem Relation Age of Onset    Heart disease Family     Hypertension Family     Thyroid disease Family     Melanoma Other         malignant, of the skin    Hypertension Mother     Hyperlipidemia Mother     Heart disease Mother     Hypertension Sister     Hyperlipidemia Sister     No Known Problems Son     Leukemia Maternal Aunt        Social History     Occupational History    Not on file   Tobacco Use    Smoking status: Never Smoker    Smokeless tobacco: Never Used   Substance and Sexual Activity    Alcohol use: No     Comment: (Occasional alcohol use-as per [employer])    Drug use: No    Sexual activity: Not on file         Current Outpatient Medications:     Ascorbic Acid (VITAMIN C) 100 MG tablet, Take 100 mg by mouth daily, Disp: , Rfl:     atorvastatin (LIPITOR) 20 mg tablet, Take 1 tablet by mouth once daily, Disp: , Rfl:     Azelastine HCl 137 MCG/SPRAY SOLN, USE 1 SPRAY(S) IN EACH NOSTRIL TWICE DAILY, Disp: , Rfl:     cetirizine (ZyrTEC) 10 mg tablet, Take 1 tablet (10 mg total) by mouth daily, Disp: 30 tablet, Rfl: 0    Cholecalciferol 1000 units capsule, Take 1,000 Units by mouth daily, Disp: , Rfl:     EVENING PRIMROSE OIL PO, CAPS, Disp: , Rfl:     fluticasone (FLONASE) 50 mcg/act nasal spray, USE 1 SPRAY(S) IN EACH NOSTRIL TWICE DAILY, Disp: , Rfl:     meloxicam (MOBIC) 15 mg tablet, Take 1 tablet (15 mg total) by mouth daily, Disp: 30 tablet, Rfl: 2    Multiple Vitamin (MULTIVITAMIN) tablet, Take 1 tablet by mouth daily, Disp: , Rfl:     SUMAtriptan (IMITREX) 50 mg tablet, Take 1 tablet (50 mg total) by mouth once as needed for migraine, Disp: 15 tablet, Rfl: 6    thiamine (VITAMIN B1) 100 mg tablet, Take 100 mg by mouth daily, Disp: , Rfl:     Allergies   Allergen Reactions    Penicillins      As a child unsure of reaction       Objective:  Vitals:    06/11/21 1459   BP: 127/84   Pulse: 62       Right Shoulder Exam     Tenderness   The patient is experiencing no tenderness  Range of Motion   Active abduction: 160   External rotation: 60   Forward flexion: 180   Internal rotation 0 degrees: Sacrum     Muscle Strength   Abduction: 4/5   Internal rotation: 5/5   External rotation: 5/5     Tests   Perea test: positive  Impingement: positive  Drop arm: negative    Other   Erythema: absent  Sensation: normal  Pulse: present            Physical Exam  Constitutional:       General: She is not in acute distress  Appearance: She is well-developed  HENT:      Head: Normocephalic and atraumatic  Eyes:      General: No scleral icterus  Conjunctiva/sclera: Conjunctivae normal    Neck:      Vascular: No JVD  Cardiovascular:      Rate and Rhythm: Normal rate  Pulmonary:      Effort: Pulmonary effort is normal  No respiratory distress  Skin:     General: Skin is warm  Neurological:      Mental Status: She is alert and oriented to person, place, and time  Coordination: Coordination normal          I have personally reviewed pertinent films in PACS and my interpretation is as follows:  MRI right shoulder: Full thickness anterior leading edge supraspinatus tendon tear

## 2021-10-20 ENCOUNTER — OFFICE VISIT (OUTPATIENT)
Dept: PODIATRY | Facility: CLINIC | Age: 56
End: 2021-10-20
Payer: COMMERCIAL

## 2021-10-20 VITALS
BODY MASS INDEX: 23.39 KG/M2 | DIASTOLIC BLOOD PRESSURE: 84 MMHG | SYSTOLIC BLOOD PRESSURE: 127 MMHG | HEIGHT: 63 IN | WEIGHT: 132 LBS | RESPIRATION RATE: 17 BRPM

## 2021-10-20 DIAGNOSIS — M21.961 ACQUIRED DEFORMITY OF RIGHT FOOT: ICD-10-CM

## 2021-10-20 DIAGNOSIS — M21.42 ACQUIRED FLAT FOOT, LEFT: ICD-10-CM

## 2021-10-20 DIAGNOSIS — M21.962 ACQUIRED DEFORMITY OF LEFT FOOT: ICD-10-CM

## 2021-10-20 DIAGNOSIS — M21.41 ACQUIRED FLAT FOOT, RIGHT: ICD-10-CM

## 2021-10-20 DIAGNOSIS — M72.2 PLANTAR FASCIITIS: Primary | ICD-10-CM

## 2021-10-20 PROCEDURE — L3020 FOOT LONGITUD/METATARSAL SUP: HCPCS | Performed by: PODIATRIST

## 2021-10-20 PROCEDURE — 99213 OFFICE O/P EST LOW 20 MIN: CPT | Performed by: PODIATRIST

## 2021-10-20 RX ORDER — MELOXICAM 15 MG/1
15 TABLET ORAL DAILY
Qty: 30 TABLET | Refills: 0 | Status: SHIPPED | OUTPATIENT
Start: 2021-10-20 | End: 2022-01-28 | Stop reason: ALTCHOICE

## 2022-01-28 ENCOUNTER — OFFICE VISIT (OUTPATIENT)
Dept: FAMILY MEDICINE CLINIC | Facility: CLINIC | Age: 57
End: 2022-01-28
Payer: COMMERCIAL

## 2022-01-28 VITALS
WEIGHT: 133 LBS | BODY MASS INDEX: 23.56 KG/M2 | OXYGEN SATURATION: 99 % | DIASTOLIC BLOOD PRESSURE: 74 MMHG | RESPIRATION RATE: 18 BRPM | SYSTOLIC BLOOD PRESSURE: 116 MMHG | TEMPERATURE: 97 F | HEART RATE: 91 BPM

## 2022-01-28 DIAGNOSIS — Z00.00 ANNUAL PHYSICAL EXAM: Primary | ICD-10-CM

## 2022-01-28 DIAGNOSIS — R14.0 POSTPRANDIAL BLOATING: ICD-10-CM

## 2022-01-28 DIAGNOSIS — R53.83 FATIGUE, UNSPECIFIED TYPE: ICD-10-CM

## 2022-01-28 PROCEDURE — 99213 OFFICE O/P EST LOW 20 MIN: CPT | Performed by: FAMILY MEDICINE

## 2022-01-28 NOTE — PROGRESS NOTES
[address] [facility]    NAME: Roger Thomas  AGE: 49 y o  SEX: female  : 1965     DATE: 2022     Assessment and Plan:     1  Annual physical exam  Anticipatory guidance provided  Pertinent screening tests ordered  Will follow up with lab results  Patient will return to clinic for annual GYN exam    - Lipid Panel with Direct LDL reflex  - Comprehensive metabolic panel  - CBC and differential  - Hemoglobin A1C  - Hepatitis C antibody  - TSH, 3rd generation with Free T4 reflex    2  Fatigue, unspecified type  Patient denies mood symptoms  Will r/o physiologic causes for fatigue   - Comprehensive metabolic panel  - CBC and differential  - Hemoglobin A1C  - TSH, 3rd generation with Free T4 reflex    3  Postprandial bloating  Patient endorses bloating but no other symptoms    - Instructed to start a food/symptom diary and bring it for follow up visit in order to identify possible triggers      Immunizations and preventive care screenings were discussed with patient today  Appropriate education was printed on patient's after visit summary  Counseling:  Alcohol/drug use: discussed moderation in alcohol intake, the recommendations for healthy alcohol use, and avoidance of illicit drug use  Dental Health: discussed importance of regular tooth brushing, flossing, and dental visits  Injury prevention: discussed safety/seat belts, safety helmets, smoke detectors, carbon dioxide detectors, and smoking near bedding or upholstery  Sexual health: discussed sexually transmitted diseases, partner selection, use of condoms, avoidance of unintended pregnancy, and contraceptive alternatives  · Exercise: the importance of regular exercise/physical activity was discussed  Recommend exercise 3-5 times per week for at least 30 minutes  Return in about 4 weeks (around 2022) for Recheck       Chief Complaint:     Chief Complaint   Patient presents with    Follow-up      History of Present Illness:     Adult Annual Physical   Patient here for a comprehensive physical exam  The patient reports Bloating after eating  There is no specific food or trigger that causes the bloating  Also, she states that her mother passed 1 month ago from liver cancer (does not recall which type) and wants to get a general checkup today  Diet and Physical Activity  · Diet/Nutrition: well balanced diet  · Exercise: 5-7 times a week on average and 30-60 minutes on average  Depression Screening  PHQ-2/9 Depression Screening         General Health  · Sleep: sleeps well  · Hearing: normal - bilateral   · Vision: most recent eye exam <1 year ago and wears glasses  · Dental: regular dental visits, brushes teeth twice daily and flosses teeth occasionally  /GYN Health  · Patient is: postmenopausal  · Last menstrual period: > 1 yr ago  · Experiences mild hot flashes for which she takes primrose oil with good results  Review of Systems:     Review of Systems   Constitutional: Positive for fatigue (Some days feels less energy than usual)  Negative for chills, fever and unexpected weight change  HENT: Negative for ear pain and sore throat  Eyes: Negative for pain and visual disturbance  Respiratory: Negative for cough and shortness of breath  Cardiovascular: Negative for chest pain and palpitations  Gastrointestinal: Positive for abdominal distention (After eating she feels bloating but self resolves)  Negative for abdominal pain, constipation, diarrhea, nausea and vomiting  Genitourinary: Negative for dysuria, hematuria and vaginal bleeding  Musculoskeletal: Negative for arthralgias and back pain  Skin: Negative for rash  Neurological: Positive for headaches (Chronic)  Negative for tremors, seizures, syncope and light-headedness  Psychiatric/Behavioral: Negative for dysphoric mood and suicidal ideas        Past Medical History:     Past Medical History:   Diagnosis Date    Allergic rhinitis     History of screening mammography     last assessed: 10/28/2013    Hyperlipidemia     Left lumbar radiculopathy     last assessed: 08/02/2017    Mitral valve regurgitation     trace, by prior echocardiogram    Tricuspid regurgitation     trace, by prior echocardiogram    Vagina bleeding     last assessed: 12/08/2014      Past Surgical History:     Past Surgical History:   Procedure Laterality Date    EPIDURAL BLOCK INJECTION Right 1/18/2018    Procedure: TRANSFORAMINAL L5-S1;  Surgeon: Radhika Taylor MD;  Location: [facility] MAIN OR;  Service: Pain Management     HYSTERECTOMY  2014      Social History:     Social History     Socioeconomic History    Marital status: /Civil Union     Spouse name: None    Number of children: None    Years of education: None    Highest education level: None   Occupational History    None   Tobacco Use    Smoking status: Never Smoker    Smokeless tobacco: Never Used   Vaping Use    Vaping Use: Never used   Substance and Sexual Activity    Alcohol use: No     Comment: (Occasional alcohol use-as per [employer])    Drug use: No    Sexual activity: None   Other Topics Concern    None   Social History Narrative    None     Social Determinants of Health     Financial Resource Strain: Not on file   Food Insecurity: Not on file   Transportation Needs: Not on file   Physical Activity: Not on file   Stress: Not on file   Social Connections: Not on file   Intimate Partner Violence: Not on file   Housing Stability: Not on file      Family History:     Family History   Problem Relation Age of Onset    Heart disease Family     Hypertension Family     Thyroid disease Family     Melanoma Other         malignant, of the skin    Hypertension Mother     Hyperlipidemia Mother     Heart disease Mother     Hypertension Sister     Hyperlipidemia Sister     No Known Problems Son     Leukemia Maternal Aunt Current Medications:     Current Outpatient Medications   Medication Sig Dispense Refill    Ascorbic Acid (VITAMIN C) 100 MG tablet Take 100 mg by mouth daily      atorvastatin (LIPITOR) 20 mg tablet Take 1 tablet by mouth once daily      Azelastine HCl 137 MCG/SPRAY SOLN USE 1 SPRAY(S) IN EACH NOSTRIL TWICE DAILY      cetirizine (ZyrTEC) 10 mg tablet Take 1 tablet (10 mg total) by mouth daily 30 tablet 0    Cholecalciferol 1000 units capsule Take 1,000 Units by mouth daily      EVENING PRIMROSE OIL PO CAPS      fluticasone (FLONASE) 50 mcg/act nasal spray USE 1 SPRAY(S) IN EACH NOSTRIL TWICE DAILY      Multiple Vitamin (MULTIVITAMIN) tablet Take 1 tablet by mouth daily      SUMAtriptan (IMITREX) 50 mg tablet Take 1 tablet (50 mg total) by mouth once as needed for migraine 15 tablet 6    thiamine (VITAMIN B1) 100 mg tablet Take 100 mg by mouth daily       No current facility-administered medications for this visit  Allergies: Allergies   Allergen Reactions    Penicillins      As a child unsure of reaction      Physical Exam:     /74   Pulse 91   Temp (!) 97 °F (36 1 °C)   Resp 18   Wt 60 3 kg (133 lb)   SpO2 99%   BMI 23 56 kg/m²     Physical Exam  Vitals reviewed  Constitutional:       General: She is not in acute distress  Appearance: Normal appearance  She is normal weight  HENT:      Right Ear: Tympanic membrane, ear canal and external ear normal       Left Ear: Tympanic membrane, ear canal and external ear normal       Nose: No congestion or rhinorrhea  Mouth/Throat:      Mouth: Mucous membranes are moist       Pharynx: No oropharyngeal exudate  Eyes:      Extraocular Movements: Extraocular movements intact  Conjunctiva/sclera: Conjunctivae normal       Pupils: Pupils are equal, round, and reactive to light  Cardiovascular:      Rate and Rhythm: Normal rate and regular rhythm  Pulses: Normal pulses  Heart sounds: Normal heart sounds     Pulmonary: Effort: No respiratory distress  Breath sounds: Normal breath sounds  No wheezing  Abdominal:      General: Abdomen is flat  Bowel sounds are normal  There is no distension  Palpations: Abdomen is soft  Tenderness: There is no abdominal tenderness  Musculoskeletal:         General: Normal range of motion  Cervical back: Normal range of motion and neck supple  Right lower leg: No edema  Left lower leg: No edema  Lymphadenopathy:      Cervical: No cervical adenopathy  Skin:     Capillary Refill: Capillary refill takes less than 2 seconds  Coloration: Skin is not jaundiced  Findings: No rash  Neurological:      General: No focal deficit present  Mental Status: She is alert and oriented to person, place, and time     Psychiatric:         Mood and Affect: Mood normal          Jacinta Negrete MD  [address]

## 2022-02-01 LAB
ALBUMIN SERPL-MCNC: 4.4 G/DL (ref 3.8–4.9)
ALBUMIN/GLOB SERPL: 1.8 {RATIO} (ref 1.2–2.2)
ALP SERPL-CCNC: 56 IU/L (ref 44–121)
ALT SERPL-CCNC: 15 IU/L (ref 0–32)
AST SERPL-CCNC: 21 IU/L (ref 0–40)
BASOPHILS # BLD AUTO: 0 X10E3/UL (ref 0–0.2)
BASOPHILS NFR BLD AUTO: 1 %
BILIRUB SERPL-MCNC: 0.2 MG/DL (ref 0–1.2)
BUN SERPL-MCNC: 11 MG/DL (ref 6–24)
BUN/CREAT SERPL: 14 (ref 9–23)
CALCIUM SERPL-MCNC: 9.8 MG/DL (ref 8.7–10.2)
CHLORIDE SERPL-SCNC: 104 MMOL/L (ref 96–106)
CHOLEST SERPL-MCNC: 235 MG/DL (ref 100–199)
CO2 SERPL-SCNC: 23 MMOL/L (ref 20–29)
CREAT SERPL-MCNC: 0.76 MG/DL (ref 0.57–1)
EOSINOPHIL # BLD AUTO: 0.1 X10E3/UL (ref 0–0.4)
EOSINOPHIL NFR BLD AUTO: 2 %
ERYTHROCYTE [DISTWIDTH] IN BLOOD BY AUTOMATED COUNT: 12.5 % (ref 11.7–15.4)
EST. AVERAGE GLUCOSE BLD GHB EST-MCNC: 114 MG/DL
GLOBULIN SER-MCNC: 2.5 G/DL (ref 1.5–4.5)
GLUCOSE SERPL-MCNC: 103 MG/DL (ref 65–99)
HBA1C MFR BLD: 5.6 % (ref 4.8–5.6)
HCT VFR BLD AUTO: 40.2 % (ref 34–46.6)
HCV AB S/CO SERPL IA: <0.1 S/CO RATIO (ref 0–0.9)
HDLC SERPL-MCNC: 62 MG/DL
HGB BLD-MCNC: 13.5 G/DL (ref 11.1–15.9)
IMM GRANULOCYTES # BLD: 0 X10E3/UL (ref 0–0.1)
IMM GRANULOCYTES NFR BLD: 0 %
LDLC SERPL CALC-MCNC: 150 MG/DL (ref 0–99)
LDLC/HDLC SERPL: 2.4 RATIO (ref 0–3.2)
LYMPHOCYTES # BLD AUTO: 1.9 X10E3/UL (ref 0.7–3.1)
LYMPHOCYTES NFR BLD AUTO: 35 %
MCH RBC QN AUTO: 29.8 PG (ref 26.6–33)
MCHC RBC AUTO-ENTMCNC: 33.6 G/DL (ref 31.5–35.7)
MCV RBC AUTO: 89 FL (ref 79–97)
MONOCYTES # BLD AUTO: 0.5 X10E3/UL (ref 0.1–0.9)
MONOCYTES NFR BLD AUTO: 10 %
NEUTROPHILS # BLD AUTO: 2.7 X10E3/UL (ref 1.4–7)
NEUTROPHILS NFR BLD AUTO: 52 %
PLATELET # BLD AUTO: 193 X10E3/UL (ref 150–450)
POTASSIUM SERPL-SCNC: 4.2 MMOL/L (ref 3.5–5.2)
PROT SERPL-MCNC: 6.9 G/DL (ref 6–8.5)
RBC # BLD AUTO: 4.53 X10E6/UL (ref 3.77–5.28)
SL AMB EGFR AFRICAN AMERICAN: 103 ML/MIN/1.73
SL AMB EGFR NON AFRICAN AMERICAN: 89 ML/MIN/1.73
SL AMB VLDL CHOLESTEROL CALC: 23 MG/DL (ref 5–40)
SODIUM SERPL-SCNC: 144 MMOL/L (ref 134–144)
TRIGL SERPL-MCNC: 132 MG/DL (ref 0–149)
TSH SERPL DL<=0.005 MIU/L-ACNC: 2.03 UIU/ML (ref 0.45–4.5)
WBC # BLD AUTO: 5.2 X10E3/UL (ref 3.4–10.8)

## 2022-02-07 ENCOUNTER — HOSPITAL ENCOUNTER (OUTPATIENT)
Dept: RADIOLOGY | Facility: HOSPITAL | Age: 57
Discharge: HOME | End: 2022-02-07
Payer: COMMERCIAL

## 2022-02-07 VITALS — WEIGHT: 130 LBS | BODY MASS INDEX: 23.04 KG/M2 | HEIGHT: 63 IN

## 2022-02-07 DIAGNOSIS — Z12.31 ENCOUNTER FOR SCREENING MAMMOGRAM FOR MALIGNANT NEOPLASM OF BREAST: ICD-10-CM

## 2022-02-07 PROCEDURE — 77063 BREAST TOMOSYNTHESIS BI: CPT

## 2022-02-07 PROCEDURE — 77067 SCR MAMMO BI INCL CAD: CPT

## 2022-02-11 ENCOUNTER — TELEPHONE (OUTPATIENT)
Dept: FAMILY MEDICINE CLINIC | Facility: CLINIC | Age: 57
End: 2022-02-11

## 2022-02-11 NOTE — TELEPHONE ENCOUNTER
S: Called patient to discuss recent lab results but there was no response  Patient with persistent elevated LDL and total cholesterol levels despite trial of lifestyle modifications  Per current guidelines, statin is indicated at this time regardless of patients calculated ASCVD risk score  All other labs wnl  Will try to contact patient again at later time      O: Labs to be discussed     Lab Results   Component Value Date    SODIUM 144 01/31/2022    K 4 2 01/31/2022     01/31/2022    CO2 23 01/31/2022    BUN 11 01/31/2022    CREATININE 0 76 01/31/2022    GLUC 103 (H) 01/31/2022    AST 21 01/31/2022    ALT 15 01/31/2022    TP 6 9 01/31/2022    TBILI 0 2 01/31/2022       Component Ref Range & Units 1/31/22  7:16 AM   Cholesterol, Total 100 - 199 mg/dL 235 High     Triglycerides 0 - 149 mg/dL 132    HDL >39 mg/dL 62    VLDL Cholesterol Calculated 5 - 40 mg/dL 23    LDL Calculated 0 - 99 mg/dL 150 High     LDl/HDL Ratio 0 0 - 3 2 ratio 2 4      A/P:  - Repeat screening in 6 months  - ASCVD risk score of 1 6% , LDL <190; no statin indicated at this time as per current guidelines  - F/U visit in 6 months

## 2022-03-09 ENCOUNTER — OFFICE VISIT (OUTPATIENT)
Dept: OBGYN CLINIC | Facility: CLINIC | Age: 57
End: 2022-03-09
Payer: COMMERCIAL

## 2022-03-09 VITALS
SYSTOLIC BLOOD PRESSURE: 120 MMHG | BODY MASS INDEX: 23.04 KG/M2 | HEIGHT: 63 IN | WEIGHT: 130 LBS | HEART RATE: 78 BPM | DIASTOLIC BLOOD PRESSURE: 75 MMHG

## 2022-03-09 DIAGNOSIS — M72.2 PLANTAR FASCIITIS OF LEFT FOOT: Primary | ICD-10-CM

## 2022-03-09 DIAGNOSIS — M75.41 SUBACROMIAL IMPINGEMENT OF RIGHT SHOULDER: ICD-10-CM

## 2022-03-09 DIAGNOSIS — M72.2 PLANTAR FASCIITIS OF RIGHT FOOT: ICD-10-CM

## 2022-03-09 DIAGNOSIS — M75.121 COMPLETE TEAR OF RIGHT ROTATOR CUFF, UNSPECIFIED WHETHER TRAUMATIC: ICD-10-CM

## 2022-03-09 PROCEDURE — 20610 DRAIN/INJ JOINT/BURSA W/O US: CPT | Performed by: ORTHOPAEDIC SURGERY

## 2022-03-09 PROCEDURE — 99214 OFFICE O/P EST MOD 30 MIN: CPT | Performed by: ORTHOPAEDIC SURGERY

## 2022-03-09 PROCEDURE — 1036F TOBACCO NON-USER: CPT | Performed by: ORTHOPAEDIC SURGERY

## 2022-03-09 PROCEDURE — 3008F BODY MASS INDEX DOCD: CPT | Performed by: ORTHOPAEDIC SURGERY

## 2022-03-09 RX ORDER — BUPIVACAINE HYDROCHLORIDE 2.5 MG/ML
4 INJECTION, SOLUTION INFILTRATION; PERINEURAL
Status: COMPLETED | OUTPATIENT
Start: 2022-03-09 | End: 2022-03-09

## 2022-03-09 RX ORDER — TRIAMCINOLONE ACETONIDE 0.25 MG/G
OINTMENT TOPICAL
COMMUNITY
Start: 2021-12-13

## 2022-03-09 RX ORDER — DEXAMETHASONE SODIUM PHOSPHATE 100 MG/10ML
40 INJECTION INTRAMUSCULAR; INTRAVENOUS
Status: COMPLETED | OUTPATIENT
Start: 2022-03-09 | End: 2022-03-09

## 2022-03-09 RX ADMIN — BUPIVACAINE HYDROCHLORIDE 4 ML: 2.5 INJECTION, SOLUTION INFILTRATION; PERINEURAL at 17:11

## 2022-03-09 RX ADMIN — DEXAMETHASONE SODIUM PHOSPHATE 40 MG: 100 INJECTION INTRAMUSCULAR; INTRAVENOUS at 17:11

## 2022-03-09 NOTE — PROGRESS NOTES
Assessment/Plan:  1  Plantar fasciitis of left foot  Durable Medical Equipment   2  Plantar fasciitis of right foot     3  Complete tear of right rotator cuff, unspecified whether traumatic     4  Subacromial impingement of right shoulder  Large joint arthrocentesis: R subacromial bursa       Scribe Attestation    I,:  Ciarra Breen am acting as a scribe while in the presence of the attending physician :       I,:  Adrien Sepulveda MD personally performed the services described in this documentation    as scribed in my presence :         Porscheenrique Turnerbs upon examination and review of the MRI of her right shoulder from 6/4/2021 does demonstrate a rotator cuff tear  She does have weakness into abduction  However, overall she is functional   She does demonstrate impingement signs on exam consistent with bursitis  I did offer non operative intervention with home exercises, as well as a steroid injection  Additionally, I did discuss that she could consider surgical intervention to repair the rotator cuff tear  As she does wish to avoid surgical intervention at this time she did opt for a steroid injection to the subacromial space  She did tolerate the injection well with no complications post injection instructions were provided  She may continue with activities of daily living as tolerated with the right upper extremity with no restrictions  I will see her back on an as-needed basis in regards to her right shoulder  Additionally, she does demonstrate signs and symptoms consistent with bilateral plantar fasciitis  She does demonstrate characteristic point tenderness at the medial calcaneal tuberosity bilaterally as well as some Achilles insertion tenderness on the right  I did discuss non operative intervention in the form of utilizing well cushioned shoes at all times and to avoid any barefoot walking in her home    I did also demonstrate home exercises should she can do that works on strengthening of the posterior lower leg as well as stretching of the Achilles and plantar fascia  She was also provided bilateral night splints to be worn to help stretch her Achilles and plantar fascia at night  She was fitted with these and instructed on proper donning and doffing by my  today  She verbalized understanding and had no further questions  I will see her back on as-needed basis in regards to her bilateral plantar fasciitis  Large joint arthrocentesis: R subacromial bursa  Universal Protocol:  Consent: Verbal consent obtained  Risks and benefits: risks, benefits and alternatives were discussed  Consent given by: patient  Timeout called at: 3/9/2022 5:10 PM   Patient understanding: patient states understanding of the procedure being performed  Radiology Images displayed and confirmed  If images not available, report reviewed: imaging studies available  Patient identity confirmed: verbally with patient    Supporting Documentation  Indications: pain   Procedure Details  Location: shoulder - R subacromial bursa  Needle size: 22 G  Ultrasound guidance: no  Approach: posterior  Medications administered: 4 mL bupivacaine 0 25 %; 40 mg dexamethasone 100 mg/10 mL    Patient tolerance: patient tolerated the procedure well with no immediate complications  Dressing:  Sterile dressing applied          Subjective:   Sj Mohan is a 49 y o  female who presents to the office today for initial evaluation of her left and right knee addition to her right shoulder  She does have a known history of rotator cuff tear right shoulder  She states that she is experiencing activity-related pain with overhead and reaching activities  She describes her pain as an intermittent and moderate sharp and aching pain  She notes that her pain is better while at rest   She has had steroid injections in the past that did provide her with symptomatic relief  She does wish to avoid surgical intervention at this time      In regards to her feet she has complaints of heel pain at the plantar aspect that is most severe in the morning with her initial steps at bed  She states that as she is able to ambulate her he will stretch and her pain will become less symptomatic  She has tried to utilize softer shoes and slippers at home with no significant improvement  She notes that pain can radiate to the arch of her foot and the toes  She denies any distal paresthesias  She also denies any trauma that has resulted in her bilateral foot pain  Review of Systems   Constitutional: Negative  HENT: Negative  Eyes: Negative  Respiratory: Negative  Cardiovascular: Negative  Gastrointestinal: Negative  Endocrine: Negative  Genitourinary: Negative  Musculoskeletal: Positive for arthralgias and myalgias  Skin: Negative  Allergic/Immunologic: Negative  Neurological: Negative  Hematological: Negative  Psychiatric/Behavioral: Negative            Past Medical History:   Diagnosis Date    Allergic rhinitis     History of screening mammography     last assessed: 10/28/2013    Hyperlipidemia     Left lumbar radiculopathy     last assessed: 08/02/2017    Mitral valve regurgitation     trace, by prior echocardiogram    Tricuspid regurgitation     trace, by prior echocardiogram    Vagina bleeding     last assessed: 12/08/2014       Past Surgical History:   Procedure Laterality Date    EPIDURAL BLOCK INJECTION Right 1/18/2018    Procedure: TRANSFORAMINAL L5-S1;  Surgeon: Polina Case MD;  Location: [place] [address] MAIN OR;  Service: Pain Management     HYSTERECTOMY  2014       Family History   Problem Relation Age of Onset    Heart disease Family     Hypertension Family     Thyroid disease Family     Melanoma Other         malignant, of the skin    Hypertension Mother     Hyperlipidemia Mother     Heart disease Mother     Liver cancer Mother     Hypertension Sister     Hyperlipidemia Sister     No Known Problems Son     Leukemia Maternal Aunt     No Known Problems Paternal Aunt        Social History     Occupational History    Not on file   Tobacco Use    Smoking status: Never Smoker    Smokeless tobacco: Never Used   Vaping Use    Vaping Use: Never used   Substance and Sexual Activity    Alcohol use: No     Comment: (Occasional alcohol use-as per [employer])    Drug use: No    Sexual activity: Not on file         Current Outpatient Medications:     Ascorbic Acid (VITAMIN C) 100 MG tablet, Take 100 mg by mouth daily, Disp: , Rfl:     atorvastatin (LIPITOR) 20 mg tablet, Take 1 tablet by mouth once daily, Disp: , Rfl:     Azelastine HCl 137 MCG/SPRAY SOLN, USE 1 SPRAY(S) IN EACH NOSTRIL TWICE DAILY, Disp: , Rfl:     cetirizine (ZyrTEC) 10 mg tablet, Take 1 tablet (10 mg total) by mouth daily, Disp: 30 tablet, Rfl: 0    Cholecalciferol 1000 units capsule, Take 1,000 Units by mouth daily, Disp: , Rfl:     EVENING PRIMROSE OIL PO, CAPS, Disp: , Rfl:     fluticasone (FLONASE) 50 mcg/act nasal spray, USE 1 SPRAY(S) IN EACH NOSTRIL TWICE DAILY, Disp: , Rfl:     Multiple Vitamin (MULTIVITAMIN) tablet, Take 1 tablet by mouth daily, Disp: , Rfl:     SUMAtriptan (IMITREX) 50 mg tablet, Take 1 tablet (50 mg total) by mouth once as needed for migraine, Disp: 15 tablet, Rfl: 6    thiamine (VITAMIN B1) 100 mg tablet, Take 100 mg by mouth daily, Disp: , Rfl:     triamcinolone (KENALOG) 0 025 % ointment, APPLY OINTMENT TOPICALLY TWICE DAILY TO HANDS AND RIGHT ABDOMEN, Disp: , Rfl:     Allergies   Allergen Reactions    Penicillins      As a child unsure of reaction       Objective:  Vitals:    03/09/22 1634   BP: 120/75   Pulse: 78       Right Ankle Exam     Tenderness   Right ankle tenderness location: medial calcaneal tuberosity  Swelling: none      Left Ankle Exam     Tenderness   Left ankle tenderness location: medial calcaneal tuberosity     Swelling: none    Range of Motion   Dorsiflexion: 10   Plantar flexion: 40     Muscle Strength   Dorsiflexion:  5/5   Plantar flexion:  5/5   Anterior tibial:  5/5   Posterior tibial:  5/5    Tests   Anterior drawer: negative  Varus tilt: negative    Other   Erythema: absent  Scars: absent  Sensation: normal  Pulse: present      Right Shoulder Exam     Range of Motion   Active abduction: 130   External rotation: 80   Internal rotation 90 degrees: 30     Muscle Strength   Abduction: 4/5   Internal rotation: 5/5   External rotation: 5/5     Tests   Impingement: positive    Other   Erythema: absent  Scars: absent  Sensation: normal  Pulse: present          Observations   Left Ankle/Foot   Negative for adhesive scar  Strength/Myotome Testing     Left Ankle/Foot   Dorsiflexion: 5  Plantar flexion: 5        Physical Exam  Vitals reviewed  Constitutional:       Appearance: She is well-developed  HENT:      Head: Normocephalic and atraumatic  Eyes:      General:         Right eye: No discharge  Left eye: No discharge  Conjunctiva/sclera: Conjunctivae normal    Cardiovascular:      Rate and Rhythm: Normal rate  Pulmonary:      Effort: Pulmonary effort is normal  No respiratory distress  Musculoskeletal:      Cervical back: Normal range of motion and neck supple  Comments: As noted in HPI   Skin:     General: Skin is warm and dry  Neurological:      Mental Status: She is alert and oriented to person, place, and time  Psychiatric:         Behavior: Behavior normal          Thought Content:  Thought content normal          Judgment: Judgment normal

## 2022-05-06 ENCOUNTER — TELEPHONE (OUTPATIENT)
Dept: FAMILY MEDICINE CLINIC | Facility: CLINIC | Age: 57
End: 2022-05-06

## 2022-05-06 NOTE — TELEPHONE ENCOUNTER
Received a follow-up from [address] [place] [address] Ave and Allergy for your review, scanned under media tab

## 2022-07-18 DIAGNOSIS — G43.919 INTRACTABLE MIGRAINE WITHOUT STATUS MIGRAINOSUS, UNSPECIFIED MIGRAINE TYPE: ICD-10-CM

## 2022-07-18 RX ORDER — SUMATRIPTAN 50 MG/1
50 TABLET, FILM COATED ORAL ONCE AS NEEDED
Qty: 15 TABLET | Refills: 0 | Status: SHIPPED | OUTPATIENT
Start: 2022-07-18

## 2022-07-18 NOTE — TELEPHONE ENCOUNTER
Dr Eleanor Norton?  Dr Natividad Mosquera here please      Sumatriptan imitrex 50 mg tablet  90 day refill    Out medication       [facility]      Any in the blue team please

## 2022-07-18 NOTE — TELEPHONE ENCOUNTER
I saw patient at annual physical and she is doing well, I agree with approval of medication since her headaches are chronic and well controlled with triptans

## 2022-09-19 ENCOUNTER — OFFICE VISIT (OUTPATIENT)
Dept: FAMILY MEDICINE CLINIC | Facility: CLINIC | Age: 57
End: 2022-09-19
Payer: COMMERCIAL

## 2022-09-19 VITALS
WEIGHT: 135.38 LBS | HEIGHT: 63 IN | BODY MASS INDEX: 23.99 KG/M2 | HEART RATE: 90 BPM | OXYGEN SATURATION: 99 % | TEMPERATURE: 98.7 F | SYSTOLIC BLOOD PRESSURE: 108 MMHG | DIASTOLIC BLOOD PRESSURE: 70 MMHG | RESPIRATION RATE: 21 BRPM

## 2022-09-19 DIAGNOSIS — Z12.11 ENCOUNTER FOR SCREENING COLONOSCOPY: ICD-10-CM

## 2022-09-19 DIAGNOSIS — Z00.00 ANNUAL PHYSICAL EXAM: Primary | ICD-10-CM

## 2022-09-19 DIAGNOSIS — E78.00 HYPERCHOLESTEROLEMIA: ICD-10-CM

## 2022-09-19 DIAGNOSIS — R14.0 BLOATING: ICD-10-CM

## 2022-09-19 PROBLEM — L98.9 SKIN LESION: Status: RESOLVED | Noted: 2018-04-27 | Resolved: 2022-09-19

## 2022-09-19 PROBLEM — R00.2 PALPITATION: Status: RESOLVED | Noted: 2019-06-14 | Resolved: 2022-09-19

## 2022-09-19 PROBLEM — M54.50 LOW BACK PAIN: Status: RESOLVED | Noted: 2018-01-18 | Resolved: 2022-09-19

## 2022-09-19 PROBLEM — R23.8 PIMPLES: Status: RESOLVED | Noted: 2018-04-25 | Resolved: 2022-09-19

## 2022-09-19 PROCEDURE — 99396 PREV VISIT EST AGE 40-64: CPT | Performed by: FAMILY MEDICINE

## 2022-09-19 RX ORDER — SIMETHICONE 125 MG
125 CAPSULE ORAL EVERY 6 HOURS PRN
Qty: 28 CAPSULE | Refills: 0 | Status: SHIPPED | OUTPATIENT
Start: 2022-09-19

## 2022-09-19 RX ORDER — ATORVASTATIN CALCIUM 20 MG/1
20 TABLET, FILM COATED ORAL DAILY
Qty: 90 TABLET | Refills: 1 | Status: SHIPPED | OUTPATIENT
Start: 2022-09-19

## 2022-09-19 NOTE — PROGRESS NOTES
[address] [facility]    NAME: Katie Lin  AGE: 56 y o  SEX: female  : 1965   DATE: 10/26/2022     Assessment and Plan:     1  Annual physical exam  - Ambulatory referral for colonoscopy; Future    2  Encounter for screening colonoscopy  - Ambulatory referral for colonoscopy; Future    3  Hypercholesterolemia  Discussed lab results during visit which showed  and total cholesterol of 235  Explained the risks of hypercholesterolemia including increased risk for stroke and other cardiovascular conditions  She expressed understanding and agreed to try medications  - atorvastatin (LIPITOR) 20 mg tablet; Take 1 tablet (20 mg total) by mouth daily  Dispense: 90 tablet; Refill: 1    4  Bloating  Chronic, occurs mostly after fatty meals  Trial of simethicone PRN  - simethicone (MYLICON,GAS-X) 200 MG CAPS; Take 1 capsule (125 mg total) by mouth every 6 (six) hours as needed for flatulence  Dispense: 28 capsule; Refill: 0    Counseling:  Alcohol/drug use: discussed moderation in alcohol intake, the recommendations for healthy alcohol use, and avoidance of illicit drug use  Dental Health: discussed importance of regular tooth brushing, flossing, and dental visits  Injury prevention: discussed safety/seat belts, safety helmets, smoke detectors, carbon dioxide detectors, and smoking near bedding or upholstery  Sexual health: discussed sexually transmitted diseases, partner selection, use of condoms, avoidance of unintended pregnancy, and contraceptive alternatives  Exercise: the importance of regular exercise/physical activity was discussed  Recommend exercise 3-5 times per week for at least 30 minutes  Immunizations and preventive care screenings: discussed with patient today  Appropriate education was printed on patient's after visit summary        Depression Screening and Follow-up Plan: Patient was screened for depression during today's encounter  They screened negative with a PHQ-2 score of 0  Return in about 6 months (around 3/19/2023) for Chronic conditions  Chief Complaint:     Chief Complaint   Patient presents with   • Annual Exam      History of Present Illness:     Adult Annual Physical   Patient here for a comprehensive physical exam  The patient reports problems - post-prandial bloating  This is a chronic problem  Associated with fatty/cheesy meals  Diet and Physical Activity  · Diet/Nutrition: well balanced diet and occassionaly has junk food like pizza  · Exercise: moderate cardiovascular exercise  Depression Screening  PHQ-2/9 Depression Screening    Little interest or pleasure in doing things: 0 - not at all  Feeling down, depressed, or hopeless: 0 - not at all  PHQ-2 Score: 0  PHQ-2 Interpretation: Negative depression screen       General Health  · Sleep: sleeps well  · Hearing: normal - bilateral   · Vision: goes for regular eye exams and most recent eye exam <1 year ago  · Dental: regular dental visits  /GYN Health  · Patient is: postmenopausal     Review of Systems:     Review of Systems   Constitutional: Negative for chills and fever  Respiratory: Negative for cough and shortness of breath  Cardiovascular: Negative for chest pain  Gastrointestinal: Negative for vomiting  Bloating and abd discomfort   Genitourinary: Negative for difficulty urinating  Psychiatric/Behavioral: Negative for dysphoric mood        Past Medical History:     Past Medical History:   Diagnosis Date   • Allergic rhinitis    • History of screening mammography     last assessed: 10/28/2013   • Hyperlipidemia    • Left lumbar radiculopathy     last assessed: 08/02/2017   • Mitral valve regurgitation     trace, by prior echocardiogram   • Tricuspid regurgitation     trace, by prior echocardiogram   • Vagina bleeding     last assessed: 12/08/2014      Past Surgical History:     Past Surgical History: Procedure Laterality Date   • EPIDURAL BLOCK INJECTION Right 1/18/2018    Procedure: TRANSFORAMINAL L5-S1;  Surgeon: Christ Ochoa MD;  Location: [place] [address] MAIN OR;  Service: Pain Management    • HYSTERECTOMY  2014      Social History:     Social History     Socioeconomic History   • Marital status: /Civil Union     Spouse name: None   • Number of children: None   • Years of education: None   • Highest education level: None   Occupational History   • None   Tobacco Use   • Smoking status: Never Smoker   • Smokeless tobacco: Never Used   Vaping Use   • Vaping Use: Never used   Substance and Sexual Activity   • Alcohol use: No     Comment: (Occasional alcohol use-as per [employer])   • Drug use: No   • Sexual activity: None   Other Topics Concern   • None   Social History Narrative   • None     Social Determinants of Health     Financial Resource Strain: Not on file   Food Insecurity: Not on file   Transportation Needs: Not on file   Physical Activity: Not on file   Stress: Not on file   Social Connections: Not on file   Intimate Partner Violence: Not on file   Housing Stability: Not on file      Family History:     Family History   Problem Relation Age of Onset   • Heart disease Family    • Hypertension Family    • Thyroid disease Family    • Melanoma Other         malignant, of the skin   • Hypertension Mother    • Hyperlipidemia Mother    • Heart disease Mother    • Liver cancer Mother    • Hypertension Sister    • Hyperlipidemia Sister    • No Known Problems Son    • Leukemia Maternal Aunt    • No Known Problems Paternal Aunt       Current Medications:     Current Outpatient Medications   Medication Sig Dispense Refill   • Ascorbic Acid (VITAMIN C) 100 MG tablet Take 100 mg by mouth daily     • atorvastatin (LIPITOR) 20 mg tablet Take 1 tablet (20 mg total) by mouth daily 90 tablet 1   • Azelastine HCl 137 MCG/SPRAY SOLN USE 1 SPRAY(S) IN EACH NOSTRIL TWICE DAILY     • cetirizine (ZyrTEC) 10 mg tablet Take 1 tablet (10 mg total) by mouth daily 30 tablet 0   • EVENING PRIMROSE OIL PO CAPS     • fluticasone (FLONASE) 50 mcg/act nasal spray USE 1 SPRAY(S) IN EACH NOSTRIL TWICE DAILY     • Multiple Vitamin (MULTIVITAMIN) tablet Take 1 tablet by mouth daily     • simethicone (MYLICON,GAS-X) 187 MG CAPS Take 1 capsule (125 mg total) by mouth every 6 (six) hours as needed for flatulence 28 capsule 0   • SUMAtriptan (IMITREX) 50 mg tablet Take 1 tablet (50 mg total) by mouth once as needed for migraine 15 tablet 0   • thiamine (VITAMIN B1) 100 mg tablet Take 100 mg by mouth daily     • Cholecalciferol 1000 units capsule Take 1,000 Units by mouth daily (Patient not taking: Reported on 9/19/2022)     • triamcinolone (KENALOG) 0 025 % ointment APPLY OINTMENT TOPICALLY TWICE DAILY TO HANDS AND RIGHT ABDOMEN (Patient not taking: Reported on 9/19/2022)       No current facility-administered medications for this visit  Allergies: Allergies   Allergen Reactions   • Penicillins      As a child unsure of reaction      Physical Exam:     /70 (BP Location: Left arm, Patient Position: Sitting, Cuff Size: Standard)   Pulse 90   Temp 98 7 °F (37 1 °C) (Tympanic)   Resp 21   Ht 5' 3" (1 6 m)   Wt 61 4 kg (135 lb 6 oz)   SpO2 99%   BMI 23 98 kg/m²     Physical Exam  Vitals reviewed  Constitutional:       General: She is not in acute distress  Appearance: Normal appearance  Cardiovascular:      Rate and Rhythm: Normal rate and regular rhythm  Heart sounds: Normal heart sounds  No murmur heard  Pulmonary:      Effort: Pulmonary effort is normal  No respiratory distress  Breath sounds: Normal breath sounds  Abdominal:      General: Bowel sounds are normal       Palpations: Abdomen is soft  Tenderness: There is no abdominal tenderness  Musculoskeletal:      Right lower leg: No edema  Left lower leg: No edema  Neurological:      General: No focal deficit present        Mental Status: She is alert    Psychiatric:         Mood and Affect: Mood normal           Tunde Menendez MD  [address]

## 2022-10-26 PROBLEM — E78.00 HYPERCHOLESTEROLEMIA: Status: ACTIVE | Noted: 2022-10-26

## 2023-01-05 ENCOUNTER — OFFICE VISIT (OUTPATIENT)
Dept: URGENT CARE | Facility: CLINIC | Age: 58
End: 2023-01-05

## 2023-01-05 VITALS
RESPIRATION RATE: 20 BRPM | WEIGHT: 137 LBS | OXYGEN SATURATION: 100 % | DIASTOLIC BLOOD PRESSURE: 67 MMHG | BODY MASS INDEX: 24.27 KG/M2 | HEIGHT: 63 IN | HEART RATE: 87 BPM | SYSTOLIC BLOOD PRESSURE: 117 MMHG | TEMPERATURE: 96.8 F

## 2023-01-05 DIAGNOSIS — H81.10 BENIGN PAROXYSMAL POSITIONAL VERTIGO, UNSPECIFIED LATERALITY: Primary | ICD-10-CM

## 2023-01-05 RX ORDER — MECLIZINE HYDROCHLORIDE 25 MG/1
25 TABLET ORAL 3 TIMES DAILY PRN
Qty: 30 TABLET | Refills: 0 | Status: SHIPPED | OUTPATIENT
Start: 2023-01-05

## 2023-01-05 NOTE — PROGRESS NOTES
[facility]        NAME: Dane Morton is a 56 y o  female  : 1965    MRN: [id number]  DATE: 2023  TIME: 7:02 PM    Assessment and Plan   Benign paroxysmal positional vertigo, unspecified laterality [H81 10]  1  Benign paroxysmal positional vertigo, unspecified laterality  meclizine (ANTIVERT) 25 mg tablet        EKG with NSR 84 bpm, possible left atrial enlargement but otherwise normal  Epley maneuver performed and was successful  Pt reports that dizziness has completely resolved  Will send meclizine  Discussed strict return to care precautions as well as red flag symptoms which should prompt immediate ED referral  Pt verbalized understanding and is in agreement with plan  Please follow up with your primary care provider within the next week  Please remember that your visit today was with an urgent care provider and should not replace follow up with your primary care provider for chronic medical issues or annual physicals  Patient Instructions       Follow up with PCP in 3-5 days  Proceed to  ER if symptoms worsen  Chief Complaint     Chief Complaint   Patient presents with   • Dizziness     DEVELOPED VERTIGO LIKE SYMPTOMS WHILE DRIVING TODAY HAS HAD NAUSEA AND VOMITING SINCE         History of Present Illness       Pt is a 57 yo female with no reported pmh presenting with dizziness x 6 hours  States she was driving when suddenly she felt like the world was spinning around her  She was able to get to her destination, and then when she got in the car with her daughter the dizziness continued and she had 3 episodes of vomiting while in the car  She denies any continued nausea right now but still complains of dizziness  Denies visual changes, headache, numbness/tingling, chest pain, sob  Review of Systems   Review of Systems   Constitutional: Negative for chills, diaphoresis, fatigue and fever     HENT: Negative for congestion, ear pain, sinus pain, sore throat and trouble swallowing  Eyes: Negative for pain and redness  Respiratory: Negative for cough, chest tightness, shortness of breath and wheezing  Cardiovascular: Negative for chest pain and leg swelling  Gastrointestinal: Negative for diarrhea, nausea and vomiting  Musculoskeletal: Negative for myalgias  Neurological: Positive for dizziness  Negative for syncope, facial asymmetry, speech difficulty, weakness, light-headedness, numbness and headaches  Psychiatric/Behavioral: Negative for behavioral problems and confusion           Current Medications       Current Outpatient Medications:   •  Ascorbic Acid (VITAMIN C) 100 MG tablet, Take 100 mg by mouth daily, Disp: , Rfl:   •  atorvastatin (LIPITOR) 20 mg tablet, Take 1 tablet (20 mg total) by mouth daily, Disp: 90 tablet, Rfl: 1  •  cetirizine (ZyrTEC) 10 mg tablet, Take 1 tablet (10 mg total) by mouth daily, Disp: 30 tablet, Rfl: 0  •  EVENING PRIMROSE OIL PO, CAPS, Disp: , Rfl:   •  fluticasone (FLONASE) 50 mcg/act nasal spray, USE 1 SPRAY(S) IN EACH NOSTRIL TWICE DAILY, Disp: , Rfl:   •  meclizine (ANTIVERT) 25 mg tablet, Take 1 tablet (25 mg total) by mouth 3 (three) times a day as needed for dizziness, Disp: 30 tablet, Rfl: 0  •  Multiple Vitamin (MULTIVITAMIN) tablet, Take 1 tablet by mouth daily, Disp: , Rfl:   •  SUMAtriptan (IMITREX) 50 mg tablet, Take 1 tablet (50 mg total) by mouth once as needed for migraine, Disp: 15 tablet, Rfl: 0  •  Azelastine HCl 137 MCG/SPRAY SOLN, USE 1 SPRAY(S) IN EACH NOSTRIL TWICE DAILY, Disp: , Rfl:   •  Cholecalciferol 1000 units capsule, Take 1,000 Units by mouth daily (Patient not taking: Reported on 9/19/2022), Disp: , Rfl:   •  simethicone (MYLICON,GAS-X) 643 MG CAPS, Take 1 capsule (125 mg total) by mouth every 6 (six) hours as needed for flatulence, Disp: 28 capsule, Rfl: 0  •  thiamine (VITAMIN B1) 100 mg tablet, Take 100 mg by mouth daily (Patient not taking: Reported on 1/5/2023), Disp: , Rfl:   •  triamcinolone (KENALOG) 0 025 % ointment, APPLY OINTMENT TOPICALLY TWICE DAILY TO HANDS AND RIGHT ABDOMEN (Patient not taking: Reported on 9/19/2022), Disp: , Rfl:     Current Allergies     Allergies as of 01/05/2023 - Reviewed 01/05/2023   Allergen Reaction Noted   • Penicillins  01/18/2018            The following portions of the patient's history were reviewed and updated as appropriate: allergies, current medications, past family history, past medical history, past social history, past surgical history and problem list      Past Medical History:   Diagnosis Date   • Allergic rhinitis    • History of screening mammography     last assessed: 10/28/2013   • Hyperlipidemia    • Left lumbar radiculopathy     last assessed: 08/02/2017   • Mitral valve regurgitation     trace, by prior echocardiogram   • Tricuspid regurgitation     trace, by prior echocardiogram   • Vagina bleeding     last assessed: 12/08/2014       Past Surgical History:   Procedure Laterality Date   • EPIDURAL BLOCK INJECTION Right 1/18/2018    Procedure: TRANSFORAMINAL L5-S1;  Surgeon: Josue Pisano MD;  Location: [facility] MAIN OR;  Service: Pain Management    • HYSTERECTOMY  2014       Family History   Problem Relation Age of Onset   • Heart disease Family    • Hypertension Family    • Thyroid disease Family    • Melanoma Other         malignant, of the skin   • Hypertension Mother    • Hyperlipidemia Mother    • Heart disease Mother    • Liver cancer Mother    • Hypertension Sister    • Hyperlipidemia Sister    • No Known Problems Son    • Leukemia Maternal Aunt    • No Known Problems Paternal Aunt          Medications have been verified  Objective   /67   Pulse 87   Temp (!) 96 8 °F (36 °C)   Resp 20   Ht 5' 3" (1 6 m)   Wt 62 1 kg (137 lb)   SpO2 100%   BMI 24 27 kg/m²        Physical Exam     Physical Exam  Vitals and nursing note reviewed  Constitutional:       General: She is not in acute distress  Appearance: Normal appearance  She is not ill-appearing, toxic-appearing or diaphoretic  HENT:      Head: Normocephalic and atraumatic  Right Ear: Tympanic membrane, ear canal and external ear normal       Left Ear: Tympanic membrane, ear canal and external ear normal       Nose: Nose normal       Mouth/Throat:      Mouth: Mucous membranes are moist       Pharynx: Oropharynx is clear  No oropharyngeal exudate or posterior oropharyngeal erythema  Eyes:      Extraocular Movements: Extraocular movements intact  Right eye: Nystagmus (fatiguable horizontal) present  Conjunctiva/sclera: Conjunctivae normal       Pupils: Pupils are equal, round, and reactive to light  Cardiovascular:      Rate and Rhythm: Normal rate and regular rhythm  Heart sounds: Normal heart sounds  Pulmonary:      Effort: Pulmonary effort is normal  No respiratory distress  Breath sounds: Normal breath sounds  Skin:     General: Skin is warm and dry  Capillary Refill: Capillary refill takes less than 2 seconds  Neurological:      Mental Status: She is alert and oriented to person, place, and time  Cranial Nerves: No cranial nerve deficit  Sensory: No sensory deficit  Motor: No weakness        Coordination: Coordination normal       Gait: Gait normal       Deep Tendon Reflexes: Reflexes normal    Psychiatric:         Behavior: Behavior normal

## 2023-01-07 LAB
ATRIAL RATE: 84 BPM
ATRIAL RATE: 84 BPM
ATRIAL RATE: 86 BPM
P AXIS: 70 DEGREES
P AXIS: 70 DEGREES
P AXIS: 73 DEGREES
PR INTERVAL: 156 MS
PR INTERVAL: 156 MS
PR INTERVAL: 158 MS
QRS AXIS: 34 DEGREES
QRS AXIS: 43 DEGREES
QRS AXIS: 43 DEGREES
QRSD INTERVAL: 78 MS
QRSD INTERVAL: 78 MS
QRSD INTERVAL: 80 MS
QT INTERVAL: 382 MS
QT INTERVAL: 384 MS
QT INTERVAL: 384 MS
QTC INTERVAL: 453 MS
QTC INTERVAL: 453 MS
QTC INTERVAL: 457 MS
T WAVE AXIS: 26 DEGREES
T WAVE AXIS: 33 DEGREES
T WAVE AXIS: 33 DEGREES
VENTRICULAR RATE: 84 BPM
VENTRICULAR RATE: 84 BPM
VENTRICULAR RATE: 86 BPM

## 2023-01-25 ENCOUNTER — OFFICE VISIT (OUTPATIENT)
Dept: OBGYN CLINIC | Facility: CLINIC | Age: 58
End: 2023-01-25

## 2023-01-25 VITALS
DIASTOLIC BLOOD PRESSURE: 75 MMHG | HEIGHT: 63 IN | WEIGHT: 136.4 LBS | HEART RATE: 76 BPM | BODY MASS INDEX: 24.17 KG/M2 | SYSTOLIC BLOOD PRESSURE: 117 MMHG

## 2023-01-25 DIAGNOSIS — M65.312 TRIGGER THUMB OF LEFT HAND: ICD-10-CM

## 2023-01-25 DIAGNOSIS — M75.41 SUBACROMIAL IMPINGEMENT OF RIGHT SHOULDER: Primary | ICD-10-CM

## 2023-01-25 RX ORDER — TRIAMCINOLONE ACETONIDE 40 MG/ML
20 INJECTION, SUSPENSION INTRA-ARTICULAR; INTRAMUSCULAR
Status: COMPLETED | OUTPATIENT
Start: 2023-01-25 | End: 2023-01-25

## 2023-01-25 RX ORDER — DEXAMETHASONE SODIUM PHOSPHATE 10 MG/ML
40 INJECTION, SOLUTION INTRAMUSCULAR; INTRAVENOUS
Status: COMPLETED | OUTPATIENT
Start: 2023-01-25 | End: 2023-01-25

## 2023-01-25 RX ADMIN — DEXAMETHASONE SODIUM PHOSPHATE 40 MG: 10 INJECTION, SOLUTION INTRAMUSCULAR; INTRAVENOUS at 17:03

## 2023-01-25 RX ADMIN — TRIAMCINOLONE ACETONIDE 20 MG: 40 INJECTION, SUSPENSION INTRA-ARTICULAR; INTRAMUSCULAR at 17:03

## 2023-01-25 NOTE — PROGRESS NOTES
Assessment/Plan:  1  Subacromial impingement of right shoulder  Large joint arthrocentesis: R subacromial bursa      2  Trigger thumb of left hand  Small joint arthrocentesis: L thumb MCP        Scribe Attestation    I,:  Edgardo Allyson am acting as a scribe while in the presence of the attending physician :       I,:  Montse Toussaint MD personally performed the services described in this documentation    as scribed in my presence :         Sukhjinder Deluna upon exam today does demonstrate subacromial impingement of the right shoulder  Her strength and range of motion are quite good today but does have a positive impingement test  She did well with a previous steroid injection and has elected to undergo another in the office today  She tolerated this procedure well without complications  As for her left thumb, she does have a trigger thumb with active triggering  She has a large painful nodule at the A1 pulley as well  I think she will do well with a steroid injection for this as well  I did discuss she may only have a maximum of 2 injections before discussing surgery  She would also need to wait a minimum of 3 months between injections or before surgery is performed  She understood this and elected to receive a steroid injection for there trigger thumb as well  Large joint arthrocentesis: R subacromial bursa  Universal Protocol:  Consent: Verbal consent obtained  Risks and benefits: risks, benefits and alternatives were discussed  Consent given by: patient  Time out: Immediately prior to procedure a "time out" was called to verify the correct patient, procedure, equipment, support staff and site/side marked as required    Timeout called at: 1/25/2023 4:52 PM   Patient understanding: patient states understanding of the procedure being performed  Site marked: the operative site was marked  Patient identity confirmed: verbally with patient    Supporting Documentation  Indications: pain   Procedure Details  Location: shoulder - R subacromial bursa  Preparation: Patient was prepped and draped in the usual sterile fashion  Needle size: 22 G  Ultrasound guidance: no  Approach: posterolateral  Medications administered: 40 mg dexamethasone 100 mg/10 mL (4 mL ropivacaine HCL 0 5% )    Patient tolerance: patient tolerated the procedure well with no immediate complications  Dressing:  Sterile dressing applied    Small joint arthrocentesis: L thumb MCP  Universal Protocol:  Consent: Verbal consent obtained  Risks and benefits: risks, benefits and alternatives were discussed  Consent given by: patient  Time out: Immediately prior to procedure a "time out" was called to verify the correct patient, procedure, equipment, support staff and site/side marked as required  Timeout called at: 1/25/2023 4:53 PM   Patient understanding: patient states understanding of the procedure being performed  Site marked: the operative site was marked  Patient identity confirmed: verbally with patient    Supporting Documentation  Indications: pain and joint swelling   Procedure Details  Location: thumb - L thumb MCP  Needle size: 25 G  Approach: volar  Medications administered: 20 mg triamcinolone acetonide 40 mg/mL (0 5 mL ropivacaine HCL 0 5%)    Patient tolerance: patient tolerated the procedure well with no immediate complications  Dressing:  Sterile dressing applied          Subjective:   Juan Randle is a 56 y o  female who presents for evaluation of her right shoulder and left thumb  She was last seen in March 2022 for her right shoulder and received a steroid injection in the right shoulder which did provide a few months of relief  She states her right shoulder pain has since returned  She does her best to remain active and lifts weights regularly  She does notice pain and soreness after lifting  For her left thumb, her pain started about 2 weeks ago   She states her symptoms are the worse in the morning and the thumb has cracking sensations  She feels like the thumb is inflammed  Review of Systems   Constitutional: Negative for chills, fever and unexpected weight change  HENT: Negative for nosebleeds and sore throat  Eyes: Negative for pain, redness and visual disturbance  Respiratory: Negative for cough, shortness of breath and wheezing  Cardiovascular: Negative for chest pain, palpitations and leg swelling  Gastrointestinal: Negative for nausea and vomiting  Endocrine: Negative for polydipsia and polyuria  Genitourinary: Negative for dysuria and hematuria  Musculoskeletal: Positive for arthralgias and myalgias  See HPI   Skin: Negative for rash and wound  Neurological: Negative for dizziness, numbness and headaches  Psychiatric/Behavioral: Negative for decreased concentration  The patient is not nervous/anxious            Past Medical History:   Diagnosis Date   • Allergic rhinitis    • History of screening mammography     last assessed: 10/28/2013   • Hyperlipidemia    • Left lumbar radiculopathy     last assessed: 08/02/2017   • Mitral valve regurgitation     trace, by prior echocardiogram   • Tricuspid regurgitation     trace, by prior echocardiogram   • Vagina bleeding     last assessed: 12/08/2014       Past Surgical History:   Procedure Laterality Date   • EPIDURAL BLOCK INJECTION Right 1/18/2018    Procedure: TRANSFORAMINAL L5-S1;  Surgeon: Warren Salgado MD;  Location: [place] [address] MAIN OR;  Service: Pain Management    • HYSTERECTOMY  2014       Family History   Problem Relation Age of Onset   • Heart disease Family    • Hypertension Family    • Thyroid disease Family    • Melanoma Other         malignant, of the skin   • Hypertension Mother    • Hyperlipidemia Mother    • Heart disease Mother    • Liver cancer Mother    • Hypertension Sister    • Hyperlipidemia Sister    • No Known Problems Son    • Leukemia Maternal Aunt    • No Known Problems Paternal Aunt        Social History Occupational History   • Not on file   Tobacco Use   • Smoking status: Never   • Smokeless tobacco: Never   Vaping Use   • Vaping Use: Never used   Substance and Sexual Activity   • Alcohol use: No     Comment: (Occasional alcohol use-as per [employer])   • Drug use: No   • Sexual activity: Not on file         Current Outpatient Medications:   •  Ascorbic Acid (VITAMIN C) 100 MG tablet, Take 100 mg by mouth daily, Disp: , Rfl:   •  atorvastatin (LIPITOR) 20 mg tablet, Take 1 tablet (20 mg total) by mouth daily, Disp: 90 tablet, Rfl: 1  •  Azelastine HCl 137 MCG/SPRAY SOLN, USE 1 SPRAY(S) IN EACH NOSTRIL TWICE DAILY, Disp: , Rfl:   •  cetirizine (ZyrTEC) 10 mg tablet, Take 1 tablet (10 mg total) by mouth daily, Disp: 30 tablet, Rfl: 0  •  EVENING PRIMROSE OIL PO, CAPS, Disp: , Rfl:   •  fluticasone (FLONASE) 50 mcg/act nasal spray, USE 1 SPRAY(S) IN EACH NOSTRIL TWICE DAILY, Disp: , Rfl:   •  meclizine (ANTIVERT) 25 mg tablet, Take 1 tablet (25 mg total) by mouth 3 (three) times a day as needed for dizziness, Disp: 30 tablet, Rfl: 0  •  Multiple Vitamin (MULTIVITAMIN) tablet, Take 1 tablet by mouth daily, Disp: , Rfl:   •  simethicone (MYLICON,GAS-X) 046 MG CAPS, Take 1 capsule (125 mg total) by mouth every 6 (six) hours as needed for flatulence, Disp: 28 capsule, Rfl: 0  •  SUMAtriptan (IMITREX) 50 mg tablet, Take 1 tablet (50 mg total) by mouth once as needed for migraine, Disp: 15 tablet, Rfl: 0  •  thiamine (VITAMIN B1) 100 mg tablet, Take 100 mg by mouth daily, Disp: , Rfl:   •  triamcinolone (KENALOG) 0 025 % ointment, , Disp: , Rfl:   •  Cholecalciferol 1000 units capsule, Take 1,000 Units by mouth daily (Patient not taking: Reported on 9/19/2022), Disp: , Rfl:     Allergies   Allergen Reactions   • Penicillins      As a child unsure of reaction       Objective:  Vitals:    01/25/23 1632   BP: 117/75   Pulse: 76       Left Hand Exam     Tenderness   Left hand tenderness location: thumb A1 pulley      Other Sensation: normal    Comments:  Large nodule at thumb A1 pulley       Right Shoulder Exam     Range of Motion   Active abduction: 160   External rotation: 80   Internal rotation 90 degrees: 60     Muscle Strength   Abduction: 5/5   Internal rotation: 5/5   External rotation: 5/5   Supraspinatus: 5/5   Subscapularis: 5/5     Tests   Impingement: positive            Physical Exam  Vitals reviewed  HENT:      Head: Normocephalic and atraumatic  Eyes:      General:         Right eye: No discharge  Left eye: No discharge  Conjunctiva/sclera: Conjunctivae normal    Cardiovascular:      Rate and Rhythm: Normal rate  Pulmonary:      Effort: Pulmonary effort is normal  No respiratory distress  Musculoskeletal:         General: Swelling present  Right shoulder: Normal range of motion  Normal strength  Right hand: Tenderness present  Cervical back: Normal range of motion and neck supple  Comments: See Ortho   Skin:     General: Skin is warm  Neurological:      Mental Status: She is alert  I have personally reviewed pertinent films in PACS and my interpretation is as follows: No imaging reviewed today  This document was created using speech voice recognition software  Grammatical errors, random word insertions, pronoun errors, and incomplete sentences are an occasional consequence of this system due to software limitations, ambient noise, and hardware issues  Any formal questions or concerns about content, text, or information contained within the body of this dictation should be directly addressed to the provider for clarification

## 2023-02-14 ENCOUNTER — OFFICE VISIT (OUTPATIENT)
Dept: FAMILY MEDICINE CLINIC | Facility: CLINIC | Age: 58
End: 2023-02-14

## 2023-02-14 VITALS
WEIGHT: 133.7 LBS | OXYGEN SATURATION: 100 % | RESPIRATION RATE: 16 BRPM | BODY MASS INDEX: 23.68 KG/M2 | DIASTOLIC BLOOD PRESSURE: 74 MMHG | SYSTOLIC BLOOD PRESSURE: 125 MMHG | HEART RATE: 82 BPM

## 2023-02-14 DIAGNOSIS — H81.10 BENIGN PAROXYSMAL POSITIONAL VERTIGO, UNSPECIFIED LATERALITY: Primary | ICD-10-CM

## 2023-02-14 NOTE — PROGRESS NOTES
Name: Betito Riggs      : 1965      MRN: [id number]  Encounter Provider: Krystin Greene MD  Encounter Date: 2023   Encounter department: [place] [address]  Benign paroxysmal positional vertigo, unspecified laterality  Assessment & Plan:  · Patient with episode of dizziness, denies new headache, vision changes, tinnitus, syncope  · Went to urgent care, epley maneuver performed and successful  · Showed patient how to perform epley maneuver at home, video also provided on AVS  · PT referral for balance/vertigo sent  · Discussed proper use of meclizine  · Discussed red flag symptoms which should prompt visit to ED    Orders:  -     Ambulatory Referral to Physical Therapy; Future; Expected date: 2023           Subjective      Dizziness  This is a new problem  The current episode started 1 to 4 weeks ago  Episode frequency: First episode  The problem has been resolved  Associated symptoms include vertigo  Pertinent negatives include no abdominal pain, chest pain, chills, congestion, coughing, fever, nausea, vomiting or weakness  Exacerbated by: Changes in position  Treatments tried: Patient went to urgent care recently, had Epley maneuver performed  She also got meclizine  The treatment provided significant relief  Review of Systems   Constitutional: Negative for chills and fever  HENT: Negative for congestion  Respiratory: Negative for cough  Cardiovascular: Negative for chest pain  Gastrointestinal: Negative for abdominal pain, nausea and vomiting  Neurological: Positive for dizziness and vertigo  Negative for weakness         Current Outpatient Medications on File Prior to Visit   Medication Sig   • Ascorbic Acid (VITAMIN C) 100 MG tablet Take 100 mg by mouth daily   • atorvastatin (LIPITOR) 20 mg tablet Take 1 tablet (20 mg total) by mouth daily   • Azelastine HCl 137 MCG/SPRAY SOLN USE 1 SPRAY(S) IN EACH NOSTRIL TWICE DAILY   • cetirizine (ZyrTEC) 10 mg tablet Take 1 tablet (10 mg total) by mouth daily   • Cholecalciferol 1000 units capsule Take 1,000 Units by mouth daily (Patient not taking: Reported on 9/19/2022)   • EVENING PRIMROSE OIL PO CAPS   • fluticasone (FLONASE) 50 mcg/act nasal spray USE 1 SPRAY(S) IN EACH NOSTRIL TWICE DAILY   • meclizine (ANTIVERT) 25 mg tablet Take 1 tablet (25 mg total) by mouth 3 (three) times a day as needed for dizziness   • Multiple Vitamin (MULTIVITAMIN) tablet Take 1 tablet by mouth daily   • simethicone (MYLICON,GAS-X) 694 MG CAPS Take 1 capsule (125 mg total) by mouth every 6 (six) hours as needed for flatulence   • SUMAtriptan (IMITREX) 50 mg tablet Take 1 tablet (50 mg total) by mouth once as needed for migraine   • thiamine (VITAMIN B1) 100 mg tablet Take 100 mg by mouth daily   • triamcinolone (KENALOG) 0 025 % ointment        Objective     /74   Pulse 82   Resp 16   Wt 60 6 kg (133 lb 11 2 oz)   SpO2 100%   BMI 23 68 kg/m²     Physical Exam  Vitals reviewed  Constitutional:       Appearance: Normal appearance  She is well-developed  HENT:      Head: Normocephalic and atraumatic  Right Ear: Tympanic membrane, ear canal and external ear normal  There is no impacted cerumen  Left Ear: Tympanic membrane, ear canal and external ear normal  There is no impacted cerumen  Nose: No congestion  Mouth/Throat:      Mouth: Mucous membranes are moist       Pharynx: Oropharynx is clear  Eyes:      Conjunctiva/sclera: Conjunctivae normal    Cardiovascular:      Rate and Rhythm: Normal rate and regular rhythm  Pulmonary:      Effort: Pulmonary effort is normal  No respiratory distress  Musculoskeletal:      Cervical back: Normal range of motion  Skin:     General: Skin is warm and dry  Neurological:      Mental Status: She is alert and oriented to person, place, and time        Gait: Gait normal    Psychiatric:         Behavior: Behavior normal        Kelvin Juárez Tavo Farley MD

## 2023-02-15 PROBLEM — H81.10 BENIGN PAROXYSMAL POSITIONAL VERTIGO: Status: ACTIVE | Noted: 2023-02-15

## 2023-02-15 NOTE — ASSESSMENT & PLAN NOTE
· Patient with episode of dizziness, denies new headache, vision changes, tinnitus, syncope    · Went to urgent care, epley maneuver performed and successful  · Showed patient how to perform epley maneuver at home, video also provided on AVS  · PT referral for balance/vertigo sent  · Discussed proper use of meclizine  · Discussed red flag symptoms which should prompt visit to ED

## 2023-08-04 NOTE — PROGRESS NOTES
Name: Rex Dawson      : 1965      MRN: [id number]  Encounter Provider: Molina Roper MD  Encounter Date: 2023   Encounter department: [facility]       Assessment and Plan:    1. Encounter for annual routine gynecological examination  Assessment & Plan:  - Mammogram ordered  - Pap smear w/ HPV performed, await results. - Colonoscopy ordered  - Encourage use of multi-vitamins, calcium and vitamin D.  - Encourage 30 minutes of accumulated moderate-intensity physical activity on 5 or more days per week. - Patient with dyspareunia and vaginal atrophy, recommended estrace as well as use of coconut oil during intercourse. If no improvement with estrace after 8-12 weeks, will switch to Replens    Orders:  -     estradiol (ESTRACE VAGINAL) 0.1 mg/g vaginal cream; Insert 2 g into the vagina daily    2. Screening for cervical cancer  -     Liquid-based pap, screening    3. Encounter for screening mammogram for breast cancer  -     Mammo screening bilateral w 3d & cad; Future; Expected date: 2023    4. Encounter for screening colonoscopy  -     Ambulatory Referral to Gastroenterology; Future    5. Dyspareunia in female  -     estradiol (ESTRACE VAGINAL) 0.1 mg/g vaginal cream; Insert 2 g into the vagina daily        Subjective      Rex Dawson is a 56 y.o. female who presents for annual GYN exam. She is postmenopausal, denies AUB. She reports dyspareunia which she attributes to menopause and increased sensitivity since her episiotomy. GYN:  Patient is sexually active with 1 partner, although she reports dyspareunia and loss of interest in sexual activity. Denies vaginal discharge, labial erythema or lesions, dyspareunia. Last pap smear was in 2019. Results were normal per patient.   She has hysterectomy    OB:    OB History    Para Term  AB Living   1 1 1         SAB IAB Ectopic Multiple Live Births                  # Outcome Date GA Lbr Tejinder/2nd Weight Sex Delivery Anes PTL Lv   1 Term                 :  No dysuria, urinary frequency or urgency. No hematuria, flank pain, incontinence. Breast:  No breast mass, discharge and overlying skin changes such as dimpling, reddening, nipple retraction. Patient does perform self breast awareness. Last mammogram was in 2/7/2022 and normal.       Review of Systems  As stated in HPI.      Objective      /82 (BP Location: Left arm, Patient Position: Sitting, Cuff Size: Standard)   Pulse 85   Temp 98.5 °F (36.9 °C) (Temporal)   Resp 21   Ht 5' 3" (1.6 m)   Wt 61.9 kg (136 lb 6 oz)   SpO2 99%   BMI 24.16 kg/m²     General:   alert and oriented, in no acute distress, appears stated age and cooperative   Heart: regular rate and rhythm, S1, S2 normal, no murmur, click, rub or gallop   Lungs: clear to auscultation bilaterally   Abdomen: soft, non-tender, without masses or organomegaly   Vulva: normal   Vagina: Decreased rugae, thin mucosa   Cervix: Normal, +bleeding following pap   Uterus: surgically absent   Adnexa: normal adnexa          Clarice Le MD  Family Medicine PGY-3

## 2023-08-07 ENCOUNTER — ANNUAL EXAM (OUTPATIENT)
Dept: FAMILY MEDICINE CLINIC | Facility: CLINIC | Age: 58
End: 2023-08-07
Payer: COMMERCIAL

## 2023-08-07 VITALS
DIASTOLIC BLOOD PRESSURE: 82 MMHG | WEIGHT: 136.38 LBS | HEIGHT: 63 IN | TEMPERATURE: 98.5 F | BODY MASS INDEX: 24.16 KG/M2 | RESPIRATION RATE: 21 BRPM | SYSTOLIC BLOOD PRESSURE: 134 MMHG | OXYGEN SATURATION: 99 % | HEART RATE: 85 BPM

## 2023-08-07 DIAGNOSIS — N94.10 DYSPAREUNIA IN FEMALE: ICD-10-CM

## 2023-08-07 DIAGNOSIS — Z12.4 SCREENING FOR CERVICAL CANCER: ICD-10-CM

## 2023-08-07 DIAGNOSIS — Z12.31 ENCOUNTER FOR SCREENING MAMMOGRAM FOR BREAST CANCER: ICD-10-CM

## 2023-08-07 DIAGNOSIS — Z12.11 ENCOUNTER FOR SCREENING COLONOSCOPY: ICD-10-CM

## 2023-08-07 DIAGNOSIS — Z01.419 ENCOUNTER FOR ANNUAL ROUTINE GYNECOLOGICAL EXAMINATION: Primary | ICD-10-CM

## 2023-08-07 PROCEDURE — G0145 SCR C/V CYTO,THINLAYER,RESCR: HCPCS

## 2023-08-07 PROCEDURE — 99396 PREV VISIT EST AGE 40-64: CPT | Performed by: FAMILY MEDICINE

## 2023-08-07 PROCEDURE — G0476 HPV COMBO ASSAY CA SCREEN: HCPCS

## 2023-08-07 RX ORDER — ESTRADIOL 0.1 MG/G
2 CREAM VAGINAL DAILY
Qty: 42.5 G | Refills: 5 | Status: SHIPPED | OUTPATIENT
Start: 2023-08-07

## 2023-08-07 NOTE — PATIENT INSTRUCTIONS
Kegel Exercises for Women   AMBULATORY CARE:   Kegel exercises  help strengthen your pelvic muscles. Pelvic muscles hold your pelvic organs, such as your bladder and uterus, in place. Kegel exercises help prevent or control certain conditions, such as urine incontinence (leakage) or uterine prolapse. Call your doctor or physical therapist if:   You cannot feel your muscles tighten or relax. You continue to leak urine. You have questions or concerns about your condition or care. Use the correct muscles:  Pelvic muscles are the muscles you use to control urine flow. To target these muscles, stop and start the flow of urine several times. This will help you become familiar with how it feels to tighten and relax these muscles. How to do Kegel exercises:   Get into a comfortable position. You may lie down, stand up, or sit down to do these exercises. When you first try to do these exercises, it may be easier if you lie down. Tighten or squeeze your pelvic muscles slowly. It may feel like you are trying to hold back urine or gas. Hold this position for 3 seconds. Relax for 3 seconds. Repeat this cycle 10 times. Do not hold your breath when you do Kegel exercises. Keep your stomach, back, and leg muscles relaxed. Do 10 sets of Kegel exercises, at least 3 times a day. When you know how to do Kegel exercises, use different positions. This will help to strengthen your pelvic muscles as much as possible. You can do these exercises while you lie on the floor, watch TV, or while you stand. Tighten your pelvic muscles before you sneeze, cough, or lift to prevent urine leakage. You may notice improved bladder control within about 6 weeks. Follow up with your doctor or physical therapist as directed:  Write down your questions so you remember to ask them during your visits.   © Copyright Len Alva 2022 Information is for End User's use only and may not be sold, redistributed or otherwise used for commercial purposes. The above information is an  only. It is not intended as medical advice for individual conditions or treatments. Talk to your doctor, nurse or pharmacist before following any medical regimen to see if it is safe and effective for you.

## 2023-08-08 PROBLEM — Z01.419 ENCOUNTER FOR ANNUAL ROUTINE GYNECOLOGICAL EXAMINATION: Status: ACTIVE | Noted: 2023-08-08

## 2023-08-09 NOTE — ASSESSMENT & PLAN NOTE
- Mammogram ordered  - Pap smear w/ HPV performed, await results. - Colonoscopy ordered  - Encourage use of multi-vitamins, calcium and vitamin D.  - Encourage 30 minutes of accumulated moderate-intensity physical activity on 5 or more days per week. - Patient with dyspareunia and vaginal atrophy, recommended estrace as well as use of coconut oil during intercourse.  If no improvement with estrace after 8-12 weeks, will switch to Replens

## 2023-08-10 LAB
HPV HR 12 DNA CVX QL NAA+PROBE: NEGATIVE
HPV16 DNA CVX QL NAA+PROBE: NEGATIVE
HPV18 DNA CVX QL NAA+PROBE: NEGATIVE

## 2023-08-14 LAB
LAB AP GYN PRIMARY INTERPRETATION: NORMAL
Lab: NORMAL

## 2023-09-11 ENCOUNTER — HOSPITAL ENCOUNTER (EMERGENCY)
Facility: HOSPITAL | Age: 58
Discharge: HOME | End: 2023-09-11
Attending: STUDENT IN AN ORGANIZED HEALTH CARE EDUCATION/TRAINING PROGRAM | Admitting: STUDENT IN AN ORGANIZED HEALTH CARE EDUCATION/TRAINING PROGRAM
Payer: COMMERCIAL

## 2023-09-11 VITALS
TEMPERATURE: 98.1 F | HEART RATE: 84 BPM | SYSTOLIC BLOOD PRESSURE: 129 MMHG | DIASTOLIC BLOOD PRESSURE: 76 MMHG | OXYGEN SATURATION: 100 % | RESPIRATION RATE: 16 BRPM

## 2023-09-11 DIAGNOSIS — H11.30 SUBCONJUNCTIVAL HEMORRHAGE: Primary | ICD-10-CM

## 2023-09-11 PROCEDURE — 99284 EMERGENCY DEPT VISIT MOD MDM: CPT | Performed by: STUDENT IN AN ORGANIZED HEALTH CARE EDUCATION/TRAINING PROGRAM

## 2023-09-11 PROCEDURE — 99283 EMERGENCY DEPT VISIT LOW MDM: CPT

## 2023-09-11 NOTE — DISCHARGE INSTRUCTIONS
You were seen in the ED for a red eye. This is due to a subconjunctival hemorrhage. It should resolve in 10-14 days. Please follow up with your family doctor. You can also follow up with an ophthalmologist. Call to schedule an appointment. Return to the ED for any new or concerning symptoms.

## 2023-09-11 NOTE — ED PROVIDER NOTES
History  Chief Complaint   Patient presents with   • Eye Problem     Woke up with L eye bloody     Patient is a 53-year-old female, no pertinent past medical history, who presents to the emergency department for left eye redness. She states she woke up this morning and noticed the medial half of her left eye was red. She denies any trauma, sneezing, or coughing. No pain. No visual changes. No headaches, dizziness. No other complaints or concerns. Prior to Admission Medications   Prescriptions Last Dose Informant Patient Reported? Taking?    Ascorbic Acid (VITAMIN C) 100 MG tablet  Self Yes No   Sig: Take 100 mg by mouth daily   Azelastine HCl 137 MCG/SPRAY SOLN   Yes No   Sig: USE 1 SPRAY(S) IN EACH NOSTRIL TWICE DAILY   Cholecalciferol 1000 units capsule  Self Yes No   Sig: Take 1,000 Units by mouth daily   Patient not taking: Reported on 9/19/2022   EVENING PRIMROSE OIL PO   Yes No   Sig: CAPS   Multiple Vitamin (MULTIVITAMIN) tablet  Self Yes No   Sig: Take 1 tablet by mouth daily   SUMAtriptan (IMITREX) 50 mg tablet   No No   Sig: Take 1 tablet (50 mg total) by mouth once as needed for migraine   atorvastatin (LIPITOR) 20 mg tablet   No No   Sig: Take 1 tablet (20 mg total) by mouth daily   cetirizine (ZyrTEC) 10 mg tablet   No No   Sig: Take 1 tablet (10 mg total) by mouth daily   estradiol (ESTRACE VAGINAL) 0.1 mg/g vaginal cream   No No   Sig: Insert 2 g into the vagina daily   fluticasone (FLONASE) 50 mcg/act nasal spray   Yes No   Sig: USE 1 SPRAY(S) IN EACH NOSTRIL TWICE DAILY   meclizine (ANTIVERT) 25 mg tablet   No No   Sig: Take 1 tablet (25 mg total) by mouth 3 (three) times a day as needed for dizziness   simethicone (MYLICON,GAS-X) 268 MG CAPS   No No   Sig: Take 1 capsule (125 mg total) by mouth every 6 (six) hours as needed for flatulence   thiamine (VITAMIN B1) 100 mg tablet  Self Yes No   Sig: Take 100 mg by mouth daily   triamcinolone (KENALOG) 0.025 % ointment   Yes No Facility-Administered Medications: None       Past Medical History:   Diagnosis Date   • Allergic rhinitis    • History of screening mammography     last assessed: 10/28/2013   • Hyperlipidemia    • Left lumbar radiculopathy     last assessed: 08/02/2017   • Mitral valve regurgitation     trace, by prior echocardiogram   • Tricuspid regurgitation     trace, by prior echocardiogram   • Vagina bleeding     last assessed: 12/08/2014       Past Surgical History:   Procedure Laterality Date   • EPIDURAL BLOCK INJECTION Right 1/18/2018    Procedure: TRANSFORAMINAL L5-S1;  Surgeon: Marsha Barrientos MD;  Location: [facility] MAIN OR;  Service: Pain Management    • HYSTERECTOMY  2014       Family History   Problem Relation Age of Onset   • Heart disease Family    • Hypertension Family    • Thyroid disease Family    • Melanoma Other         malignant, of the skin   • Hypertension Mother    • Hyperlipidemia Mother    • Heart disease Mother    • Liver cancer Mother    • Hypertension Sister    • Hyperlipidemia Sister    • No Known Problems Son    • Leukemia Maternal Aunt    • No Known Problems Paternal Aunt      I have reviewed and agree with the history as documented. E-Cigarette/Vaping   • E-Cigarette Use Never User      E-Cigarette/Vaping Substances     Social History     Tobacco Use   • Smoking status: Never   • Smokeless tobacco: Never   Vaping Use   • Vaping Use: Never used   Substance Use Topics   • Alcohol use: No     Comment: (Occasional alcohol use-as per [employer])   • Drug use: No       Review of Systems   Eyes: Positive for redness. Negative for photophobia, pain, discharge, itching and visual disturbance. Neurological: Negative for dizziness and headaches. All other systems reviewed and are negative. Physical Exam  Physical Exam  Vitals and nursing note reviewed. Constitutional:       General: She is not in acute distress. Appearance: She is well-developed.  She is not ill-appearing, toxic-appearing or diaphoretic. HENT:      Head: Normocephalic and atraumatic. Right Ear: External ear normal.      Left Ear: External ear normal.      Nose: Nose normal.   Eyes:      General: Lids are normal. No scleral icterus. Cardiovascular:      Rate and Rhythm: Normal rate and regular rhythm. Pulmonary:      Effort: Pulmonary effort is normal. No respiratory distress. Skin:     General: Skin is warm and dry. Neurological:      General: No focal deficit present. Mental Status: She is alert. Psychiatric:         Mood and Affect: Mood normal.         Behavior: Behavior normal.         Vital Signs  ED Triage Vitals [09/11/23 1417]   Temperature Pulse Respirations Blood Pressure SpO2   98.1 °F (36.7 °C) 84 16 129/76 100 %      Temp Source Heart Rate Source Patient Position - Orthostatic VS BP Location FiO2 (%)   Tympanic Monitor Sitting Right arm --      Pain Score       No Pain           Vitals:    09/11/23 1417   BP: 129/76   Pulse: 84   Patient Position - Orthostatic VS: Sitting         Visual Acuity  Visual Acuity    Flowsheet Row Most Recent Value   Visual acuity R eye is 20/25   Visual acuity Left eye is 20/25   Visual acuity in both eyes is 20/20   Wearing corrective eyewear/lenses? Yes          ED Medications  Medications - No data to display    Diagnostic Studies  Results Reviewed     None                 No orders to display              Procedures  Procedures         ED Course                                             Medical Decision Making  Patient is a 56 y.o. female who presents to the ED for left eye redness. Patient is nontoxic, well-appearing. Vitals are stable. .    Clinical impression is subconjunctival hemorrhage. Presentation not consistent with globe rupture, foreign body, corneal abrasion. Plan: Reassurance, discharge with PCP follow-up                 Portions of the record may have been created with voice recognition software.  Occasional wrong word or "sound a like" substitutions may have occurred due to the inherent limitations of voice recognition software. Read the chart carefully and recognize, using context, where substitutions have occurred. Subconjunctival hemorrhage: acute illness or injury      Disposition  Final diagnoses:   Subconjunctival hemorrhage     Time reflects when diagnosis was documented in both MDM as applicable and the Disposition within this note     Time User Action Codes Description Comment    9/11/2023  2:57 PM Dylan Jaimes Add [H11.30] Subconjunctival hemorrhage       ED Disposition     ED Disposition   Discharge    Condition   Stable    Date/Time   Mon Sep 11, 2023  2:57 PM    Comment   Jihan Kowalski discharge to home/self care.                Follow-up Information     Follow up With Specialties Details Why Contact Info Additional Information    [employer]  Call in 1 day  [address] [facility] Emergency Department Emergency Medicine   [address] [address]  [address] [facility] Emergency Department, [address] [facility] [address] [place] [address], [address]  106 [facility] Schedule an appointment as soon as possible for a visit   [address] [place] [address] [address] [address]  [phone]             Discharge Medication List as of 9/11/2023  3:00 PM      CONTINUE these medications which have NOT CHANGED    Details   Ascorbic Acid (VITAMIN C) 100 MG tablet Take 100 mg by mouth daily, Historical Med      atorvastatin (LIPITOR) 20 mg tablet Take 1 tablet (20 mg total) by mouth daily, Starting Mon 9/19/2022, Normal      Azelastine HCl 137 MCG/SPRAY SOLN USE 1 SPRAY(S) IN EACH NOSTRIL TWICE DAILY, Historical Med      cetirizine (ZyrTEC) 10 mg tablet Take 1 tablet (10 mg total) by mouth daily, Starting Tue 3/24/2020, Normal      Cholecalciferol 1000 units capsule Take 1,000 Units by mouth daily, Historical Med      estradiol (ESTRACE VAGINAL) 0.1 mg/g vaginal cream Insert 2 g into the vagina daily, Starting Mon 8/7/2023, Normal      EVENING PRIMROSE OIL PO CAPS, Historical Med      fluticasone (FLONASE) 50 mcg/act nasal spray USE 1 SPRAY(S) IN EACH NOSTRIL TWICE DAILY, Historical Med      meclizine (ANTIVERT) 25 mg tablet Take 1 tablet (25 mg total) by mouth 3 (three) times a day as needed for dizziness, Starting Thu 1/5/2023, Normal      Multiple Vitamin (MULTIVITAMIN) tablet Take 1 tablet by mouth daily, Historical Med      simethicone (MYLICON,GAS-X) 185 MG CAPS Take 1 capsule (125 mg total) by mouth every 6 (six) hours as needed for flatulence, Starting Mon 9/19/2022, Normal      SUMAtriptan (IMITREX) 50 mg tablet Take 1 tablet (50 mg total) by mouth once as needed for migraine, Starting Mon 7/18/2022, Normal      thiamine (VITAMIN B1) 100 mg tablet Take 100 mg by mouth daily, Historical Med      triamcinolone (KENALOG) 0.025 % ointment Historical Med             No discharge procedures on file.     PDMP Review     None          ED Provider  Electronically Signed by           Adore Sanders DO  09/11/23 6630

## 2023-10-07 PROBLEM — Z01.419 ENCOUNTER FOR ANNUAL ROUTINE GYNECOLOGICAL EXAMINATION: Status: RESOLVED | Noted: 2023-08-08 | Resolved: 2023-10-07

## 2023-10-24 ENCOUNTER — OFFICE VISIT (OUTPATIENT)
Dept: FAMILY MEDICINE CLINIC | Facility: CLINIC | Age: 58
End: 2023-10-24
Payer: COMMERCIAL

## 2023-10-24 VITALS
RESPIRATION RATE: 16 BRPM | SYSTOLIC BLOOD PRESSURE: 125 MMHG | BODY MASS INDEX: 23.91 KG/M2 | OXYGEN SATURATION: 100 % | WEIGHT: 135 LBS | HEART RATE: 78 BPM | DIASTOLIC BLOOD PRESSURE: 81 MMHG

## 2023-10-24 DIAGNOSIS — H11.30 SUBCONJUNCTIVAL HEMORRHAGE, UNSPECIFIED LATERALITY: Primary | ICD-10-CM

## 2023-10-24 PROCEDURE — 99213 OFFICE O/P EST LOW 20 MIN: CPT | Performed by: FAMILY MEDICINE

## 2023-10-24 NOTE — PROGRESS NOTES
[facility] Office visit    Assessment/Plan:     1. Subconjunctival hemorrhage, unspecified laterality  Comments:  Resolved from previous episode. Patient given information on subconjuctival hemorrhages, encouraged symptom journal and tx with warm compress. F/u with PCP          No follow-ups on file. Subjective:   MIROSLAVA Rivas is a 66 y.o. female presented to clinic for follow-up from ER visit for her subconjunctival hemorrhage. On September 11 patient was seen in the ED for episode of red spot in the sclera. At that time patient was not found to have any signs of globe rupture, foreign body or corneal abrasion. Patient advised to follow-up with PCP. Patient reported since that ER visit she has had 1 episode approximately 3 weeks prior to current office visit. Patient reports she has a history of chronic migraines that she has been having since she was 27years old as well as palpitations. Patient reports subconjunctival hemorrhages not associated with migraines or palpitations. Of note patient reports palpitations and frequently worsen with cough and better with rest no change in exercise. Patient also reports that her migraines have been getting less frequent and is feeling better. Review of Systems   Constitutional:  Negative for appetite change, chills and fatigue. HENT:  Negative for sore throat and trouble swallowing. Eyes:  Negative for photophobia, redness and visual disturbance. Respiratory:  Negative for choking, chest tightness, shortness of breath and wheezing. Cardiovascular:  Negative for chest pain, palpitations and leg swelling. Gastrointestinal:  Negative for abdominal pain, blood in stool, constipation, diarrhea, nausea and vomiting. Endocrine: Negative for polydipsia and polyuria. Genitourinary:  Negative for dysuria and hematuria. Skin:  Negative for pallor and rash.    Neurological:  Negative for dizziness, tremors, syncope, weakness, light-headedness and headaches. Psychiatric/Behavioral:  Negative for agitation, behavioral problems, decreased concentration and sleep disturbance. Objective:     /81   Pulse 78   Resp 16   Wt 61.2 kg (135 lb)   SpO2 100%   BMI 23.91 kg/m²      Physical Exam  Constitutional:       General: She is not in acute distress. Appearance: Normal appearance. She is not ill-appearing. HENT:      Head: Normocephalic and atraumatic. Nose: Nose normal.      Mouth/Throat:      Mouth: Mucous membranes are moist.   Eyes:      Extraocular Movements: Extraocular movements intact. Conjunctiva/sclera: Conjunctivae normal.      Pupils: Pupils are equal, round, and reactive to light. Cardiovascular:      Rate and Rhythm: Normal rate and regular rhythm. Heart sounds: Normal heart sounds. No murmur heard. No friction rub. No gallop. Pulmonary:      Effort: Pulmonary effort is normal.      Breath sounds: Normal breath sounds. No wheezing or rhonchi. Abdominal:      General: Abdomen is flat. Bowel sounds are normal. There is no distension. Palpations: Abdomen is soft. Tenderness: There is no abdominal tenderness. There is no right CVA tenderness, left CVA tenderness or guarding. Musculoskeletal:         General: Normal range of motion. Cervical back: Normal range of motion. Right lower leg: No edema. Left lower leg: No edema. Skin:     General: Skin is warm. Capillary Refill: Capillary refill takes less than 2 seconds. Coloration: Skin is not jaundiced. Findings: No erythema or rash. Neurological:      Mental Status: She is alert. Mental status is at baseline.    Psychiatric:         Mood and Affect: Mood normal.         Behavior: Behavior normal.          ** Please Note: This note has been constructed using a voice recognition system **     An Peres MD  10/25/23  11:10 AM

## 2023-12-09 ENCOUNTER — HOSPITAL ENCOUNTER (OUTPATIENT)
Dept: RADIOLOGY | Facility: HOSPITAL | Age: 58
Discharge: HOME | End: 2023-12-09
Payer: COMMERCIAL

## 2023-12-09 DIAGNOSIS — Z12.31 ENCOUNTER FOR SCREENING MAMMOGRAM FOR BREAST CANCER: ICD-10-CM

## 2023-12-09 PROCEDURE — 77067 SCR MAMMO BI INCL CAD: CPT

## 2023-12-09 PROCEDURE — 77063 BREAST TOMOSYNTHESIS BI: CPT

## 2024-03-06 DIAGNOSIS — G43.919 INTRACTABLE MIGRAINE WITHOUT STATUS MIGRAINOSUS, UNSPECIFIED MIGRAINE TYPE: ICD-10-CM

## 2024-03-06 RX ORDER — SUMATRIPTAN 50 MG/1
TABLET, FILM COATED ORAL
Qty: 15 TABLET | Refills: 0 | Status: SHIPPED | OUTPATIENT
Start: 2024-03-06

## 2024-04-15 ENCOUNTER — OFFICE VISIT (OUTPATIENT)
Age: 59
End: 2024-04-15
Payer: COMMERCIAL

## 2024-04-15 VITALS
SYSTOLIC BLOOD PRESSURE: 121 MMHG | BODY MASS INDEX: 24.54 KG/M2 | HEIGHT: 63 IN | WEIGHT: 138.5 LBS | HEART RATE: 81 BPM | DIASTOLIC BLOOD PRESSURE: 70 MMHG | RESPIRATION RATE: 17 BRPM

## 2024-04-15 DIAGNOSIS — B35.1 ONYCHOMYCOSIS: ICD-10-CM

## 2024-04-15 DIAGNOSIS — M20.11 VALGUS DEFORMITY OF BOTH GREAT TOES: ICD-10-CM

## 2024-04-15 DIAGNOSIS — M21.961 ACQUIRED DEFORMITY OF BOTH FEET: ICD-10-CM

## 2024-04-15 DIAGNOSIS — L03.031 PARONYCHIA OF TOENAIL OF RIGHT FOOT: ICD-10-CM

## 2024-04-15 DIAGNOSIS — M21.962 ACQUIRED DEFORMITY OF BOTH FEET: ICD-10-CM

## 2024-04-15 DIAGNOSIS — M25.571 ARTHRALGIA OF RIGHT FOOT: Primary | ICD-10-CM

## 2024-04-15 DIAGNOSIS — M20.12 VALGUS DEFORMITY OF BOTH GREAT TOES: ICD-10-CM

## 2024-04-15 PROCEDURE — 99213 OFFICE O/P EST LOW 20 MIN: CPT | Performed by: PODIATRIST

## 2024-04-15 RX ORDER — TERBINAFINE HYDROCHLORIDE 250 MG/1
250 TABLET ORAL DAILY
Qty: 30 TABLET | Refills: 0 | Status: SHIPPED | OUTPATIENT
Start: 2024-04-15 | End: 2024-05-15

## 2024-04-15 NOTE — PROGRESS NOTES
Assessment/Plan: Acquired deformity foot bilateral/hallux valgus right greater than left.  Paronychia right hallux tibial nail groove secondary to mycosis.  Acquired pes planus bilateral.  History of radiculopathy.    Plan.  Chart reviewed.  Primary care notes reviewed.  Patient examined.  Patient advised on condition.  At this time we will treat for mycosis.  Patient be placed on Lamisil.  Patient to use orthotics daily to control deformity and ease pain.  Patient will consider bunion surgery if symptoms arise.  Aftercare instruction given.  Return for follow-up as needed         Diagnoses and all orders for this visit:    Arthralgia of right foot    Acquired deformity of both feet    Onychomycosis  -     terbinafine (LamISIL) 250 mg tablet; Take 1 tablet (250 mg total) by mouth daily    Valgus deformity of both great toes          Subjective: Patient is concerned with redness of her right big toe.  No history of trauma.    Allergies   Allergen Reactions    Penicillins      As a child unsure of reaction         Current Outpatient Medications:     Ascorbic Acid (VITAMIN C) 100 MG tablet, Take 100 mg by mouth daily, Disp: , Rfl:     atorvastatin (LIPITOR) 20 mg tablet, Take 1 tablet (20 mg total) by mouth daily, Disp: 90 tablet, Rfl: 1    Azelastine HCl 137 MCG/SPRAY SOLN, USE 1 SPRAY(S) IN EACH NOSTRIL TWICE DAILY, Disp: , Rfl:     cetirizine (ZyrTEC) 10 mg tablet, Take 1 tablet (10 mg total) by mouth daily, Disp: 30 tablet, Rfl: 0    EVENING PRIMROSE OIL PO, CAPS, Disp: , Rfl:     fluticasone (FLONASE) 50 mcg/act nasal spray, USE 1 SPRAY(S) IN EACH NOSTRIL TWICE DAILY, Disp: , Rfl:     meclizine (ANTIVERT) 25 mg tablet, Take 1 tablet (25 mg total) by mouth 3 (three) times a day as needed for dizziness, Disp: 30 tablet, Rfl: 0    Multiple Vitamin (MULTIVITAMIN) tablet, Take 1 tablet by mouth daily, Disp: , Rfl:     SUMAtriptan (IMITREX) 50 mg tablet, TAKE 1 TABLET BY MOUTH ONCE AS NEEDED FOR MIGRAINE HEADACHE, Disp:  15 tablet, Rfl: 0    terbinafine (LamISIL) 250 mg tablet, Take 1 tablet (250 mg total) by mouth daily, Disp: 30 tablet, Rfl: 0    thiamine (VITAMIN B1) 100 mg tablet, Take 100 mg by mouth daily, Disp: , Rfl:     Cholecalciferol 1000 units capsule, Take 1,000 Units by mouth daily (Patient not taking: Reported on 9/19/2022), Disp: , Rfl:     simethicone (MYLICON,GAS-X) 125 MG CAPS, Take 1 capsule (125 mg total) by mouth every 6 (six) hours as needed for flatulence (Patient not taking: Reported on 4/15/2024), Disp: 28 capsule, Rfl: 0    triamcinolone (KENALOG) 0.025 % ointment, , Disp: , Rfl:     Patient Active Problem List   Diagnosis    Chronic pain disorder    Intervertebral disc disorder with radiculopathy of lumbosacral region    Acquired deformity of right foot    Arthralgia of right foot    Peroneal tendinitis of right lower extremity    BMI 21.0-21.9, adult    Alopecia areata    Esophageal reflux    Lumbar radiculopathy    Migraine    Pes planus, congenital    Psoriasiform dermatitis    Hypercholesterolemia    Benign paroxysmal positional vertigo          Patient ID: Brissa Kowalski is a 58 y.o. female.    HPI    The following portions of the patient's history were reviewed and updated as appropriate:     family history includes Heart disease in her family and mother; Hyperlipidemia in her mother and sister; Hypertension in her family, mother, and sister; Leukemia in her maternal aunt; Liver cancer in her mother; Melanoma in her other; No Known Problems in her paternal aunt and son; Thyroid disease in her family.      reports that she has never smoked. She has never used smokeless tobacco. She reports that she does not drink alcohol and does not use drugs.    Vitals:    04/15/24 1324   BP: 121/70   Pulse: 81   Resp: 17       Review of Systems      Objective:  Patient's shoes and socks removed.   Foot ExamPhysical Exam         General  General Appearance: appears stated age and healthy   Orientation: alert and  oriented to person, place, and time   Affect: appropriate   Gait: antalgic         Right Foot/Ankle      Inspection and Palpation  Ecchymosis: none  Tenderness: bony tenderness, lesser metatarsophalangeal joints and metatarsals   Swelling: dorsum   Arch: pes planus  Hammertoes: fifth toe  Hallux valgus: yes  Hallux limitus: yes  Skin Exam: skin intact;      Neurovascular  Dorsalis pedis: 3+  Posterior tibial: 3+  Saphenous nerve sensation: normal  Tibial nerve sensation: normal  Superficial peroneal nerve sensation: normal  Deep peroneal nerve sensation: normal  Sural nerve sensation: normal        Left Foot/Ankle       Inspection and Palpation  Ecchymosis: none  Tenderness: bony tenderness, lesser metatarsophalangeal joints and metatarsals   Swelling: dorsum   Arch: pes planus  Hammertoes: fifth toe  Hallux valgus: yes  Hallux limitus: yes  Skin Exam: skin intact;      Neurovascular  Dorsalis pedis: 3+  Posterior tibial: 3+  Saphenous nerve sensation: normal  Tibial nerve sensation: normal  Superficial peroneal nerve sensation: normal  Deep peroneal nerve sensation: normal  Sural nerve sensation: normal           Physical Exam  Cardiovascular:      Pulses:           Dorsalis pedis pulses are 3+ on the right side and 3+ on the left side.        Posterior tibial pulses are 3+ on the right side and 3+ on the left side.   Musculoskeletal:      Right foot: Bony tenderness and bunion present.      Left foot: Bony tenderness and bunion present.        Patient is pronated in stance and gait.  There is pain with palpation plantar fascia insertion left greater than right.  There is also pain with palpation peroneus brevis tendon insertion.  All muscles and tendon test 5/5.

## 2024-05-17 ENCOUNTER — OFFICE VISIT (OUTPATIENT)
Age: 59
End: 2024-05-17

## 2024-05-17 VITALS
OXYGEN SATURATION: 99 % | WEIGHT: 138.8 LBS | HEART RATE: 69 BPM | DIASTOLIC BLOOD PRESSURE: 76 MMHG | HEIGHT: 63 IN | TEMPERATURE: 97.9 F | SYSTOLIC BLOOD PRESSURE: 128 MMHG | BODY MASS INDEX: 24.59 KG/M2

## 2024-05-17 DIAGNOSIS — H81.10 BENIGN PAROXYSMAL POSITIONAL VERTIGO, UNSPECIFIED LATERALITY: ICD-10-CM

## 2024-05-17 DIAGNOSIS — Z00.00 ANNUAL PHYSICAL EXAM: Primary | ICD-10-CM

## 2024-05-17 PROCEDURE — 99396 PREV VISIT EST AGE 40-64: CPT | Performed by: FAMILY MEDICINE

## 2024-05-17 RX ORDER — CLINDAMYCIN HYDROCHLORIDE 300 MG/1
300 CAPSULE ORAL 3 TIMES DAILY
COMMUNITY
Start: 2024-03-06

## 2024-05-17 RX ORDER — MECLIZINE HYDROCHLORIDE 25 MG/1
25 TABLET ORAL 3 TIMES DAILY PRN
Qty: 30 TABLET | Refills: 0 | Status: SHIPPED | OUTPATIENT
Start: 2024-05-17

## 2024-05-17 NOTE — PROGRESS NOTES
ADULT ANNUAL PHYSICAL  [facility]    NAME: Brissa Kowalski  AGE: 58 y.o. SEX: female : 1965   DATE: 2024     Assessment and Plan:     1. Annual physical exam  -     CBC and differential; Future  -     Comprehensive metabolic panel; Future  -     Lipid Panel with Direct LDL reflex; Future  -     CBC and differential  -     Comprehensive metabolic panel  -     Lipid Panel with Direct LDL reflex  -     TSH, 3rd generation with Free T4 reflex; Future  -     TSH, 3rd generation with Free T4 reflex  2. Benign paroxysmal positional vertigo, unspecified laterality  -     meclizine (ANTIVERT) 25 mg tablet; Take 1 tablet (25 mg total) by mouth 3 (three) times a day as needed for dizziness    Will send baseline labs. Patient also reporting memory problems. People around her have noticed as well. She would like to have memory eval. Will RTO for MOCA testing and more in depth evaluation.    Counseling:  Alcohol/drug use: discussed moderation in alcohol intake, the recommendations for healthy alcohol use, and avoidance of illicit drug use.  Dental Health: discussed importance of regular tooth brushing, flossing, and dental visits.  Injury prevention: discussed safety/seat belts, safety helmets, smoke detectors, carbon dioxide detectors, and smoking near bedding or upholstery.  Sexual health: discussed sexually transmitted diseases, partner selection, use of condoms, avoidance of unintended pregnancy, and contraceptive alternatives.  Exercise: the importance of regular exercise/physical activity was discussed. Recommend exercise 3-5 times per week for at least 30 minutes.  Immunizations and preventive care screenings: Discussed with patient today. Appropriate education was printed on patient's after visit summary.    Depression Screening and Follow-up Plan: Patient was screened for depression during today's encounter. They screened negative  with a PHQ-2 score of 0.        Return in about 5 weeks (around 6/21/2024), or MOCA testing/memory issues OVL.     Chief Complaint:     Chief Complaint   Patient presents with    Physical Exam     Discuss getting routine blood work. Having trouble remembering things lately.       History of Present Illness:     Adult Annual Physical   Patient here for a comprehensive physical exam. The patient reports problems - memory concerns . States  has to repeat himself over and over.     Diet and Physical Activity  Diet/Nutrition: well balanced diet.   Exercise: 3-4 times a week on average.      Screening Depression/Anxiety:  PHQ-2/9 Depression Screening    Little interest or pleasure in doing things: 0 - not at all  Feeling down, depressed, or hopeless: 0 - not at all  PHQ-2 Score: 0  PHQ-2 Interpretation: Negative depression screen              General Health  Sleep: sleeps well.   Hearing: normal - bilateral.  Vision: goes for regular eye exams.   Dental: regular dental visits.       /GYN Health  Patient is: postmenopausal, no issues     Review of Systems:     Review of Systems   Constitutional:  Negative for chills and fever.   HENT:  Negative for tinnitus.    Respiratory:  Negative for cough and shortness of breath.    Cardiovascular:  Negative for chest pain.   Gastrointestinal:  Negative for abdominal pain.   Genitourinary:  Negative for difficulty urinating.   Skin:  Negative for rash.   Neurological:  Positive for dizziness (ocassional episodes of vertigo with position change/movement). Negative for tremors, syncope, facial asymmetry, light-headedness and headaches.      Past Medical History:     Past Medical History:   Diagnosis Date    Allergic rhinitis     History of screening mammography     last assessed: 10/28/2013    Hyperlipidemia     Left lumbar radiculopathy     last assessed: 08/02/2017    Mitral valve regurgitation     trace, by prior echocardiogram    Tricuspid regurgitation     trace, by prior  echocardiogram    Vagina bleeding     last assessed: 12/08/2014      Past Surgical History:     Past Surgical History:   Procedure Laterality Date    EPIDURAL BLOCK INJECTION Right 1/18/2018    Procedure: TRANSFORAMINAL L5-S1;  Surgeon: Ya Oreilly MD;  Location: [facility] MAIN OR;  Service: Pain Management     HYSTERECTOMY  2014      Social History:     E-Cigarette/Vaping    E-Cigarette Use Never User      E-Cigarette/Vaping Substances    Nicotine No     THC No     CBD No     Flavoring No     Other No     Unknown No      Social History     Socioeconomic History    Marital status: /Civil Union     Spouse name: None    Number of children: None    Years of education: None    Highest education level: None   Occupational History    None   Tobacco Use    Smoking status: Never    Smokeless tobacco: Never   Vaping Use    Vaping status: Never Used   Substance and Sexual Activity    Alcohol use: No     Comment: (Occasional alcohol use-as per [employer])    Drug use: No    Sexual activity: Yes   Other Topics Concern    None   Social History Narrative    None     Social Determinants of Health     Financial Resource Strain: Low Risk  (5/17/2024)    Overall Financial Resource Strain (CARDIA)     Difficulty of Paying Living Expenses: Not hard at all   Food Insecurity: No Food Insecurity (5/17/2024)    Hunger Vital Sign     Worried About Running Out of Food in the Last Year: Never true     Ran Out of Food in the Last Year: Never true   Transportation Needs: No Transportation Needs (5/17/2024)    PRAPARE - Transportation     Lack of Transportation (Medical): No     Lack of Transportation (Non-Medical): No   Physical Activity: Not on file   Stress: Not on file   Social Connections: Not on file   Intimate Partner Violence: Not on file   Housing Stability: Low Risk  (5/17/2024)    Housing Stability Vital Sign     Unable to Pay for Housing in the Last Year: No     Number of Times Moved in the Last Year: 1     Homeless in the  "Last Year: No      Family History:     Family History   Problem Relation Age of Onset    Heart disease Family     Hypertension Family     Thyroid disease Family     Melanoma Other         malignant, of the skin    Hypertension Mother     Hyperlipidemia Mother     Heart disease Mother     Liver cancer Mother     Hypertension Sister     Hyperlipidemia Sister     No Known Problems Son     Leukemia Maternal Aunt     No Known Problems Paternal Aunt       Current Medications:     Current Outpatient Medications   Medication Sig Dispense Refill    Ascorbic Acid (VITAMIN C) 100 MG tablet Take 100 mg by mouth daily      atorvastatin (LIPITOR) 20 mg tablet Take 1 tablet (20 mg total) by mouth daily 90 tablet 1    Azelastine HCl 137 MCG/SPRAY SOLN USE 1 SPRAY(S) IN EACH NOSTRIL TWICE DAILY      cetirizine (ZyrTEC) 10 mg tablet Take 1 tablet (10 mg total) by mouth daily 30 tablet 0    clindamycin (CLEOCIN) 300 MG capsule Take 300 mg by mouth 3 (three) times a day      EVENING PRIMROSE OIL PO CAPS      fluticasone (FLONASE) 50 mcg/act nasal spray USE 1 SPRAY(S) IN EACH NOSTRIL TWICE DAILY      meclizine (ANTIVERT) 25 mg tablet Take 1 tablet (25 mg total) by mouth 3 (three) times a day as needed for dizziness 30 tablet 0    Multiple Vitamin (MULTIVITAMIN) tablet Take 1 tablet by mouth daily      SUMAtriptan (IMITREX) 50 mg tablet TAKE 1 TABLET BY MOUTH ONCE AS NEEDED FOR MIGRAINE HEADACHE 15 tablet 0    thiamine (VITAMIN B1) 100 mg tablet Take 100 mg by mouth daily       No current facility-administered medications for this visit.      Allergies:     Allergies   Allergen Reactions    Penicillins      As a child unsure of reaction      Physical Exam:     /76 (BP Location: Left arm, Patient Position: Sitting, Cuff Size: Standard)   Pulse 69   Temp 97.9 °F (36.6 °C) (Tympanic)   Ht 5' 3\" (1.6 m)   Wt 63 kg (138 lb 12.8 oz)   SpO2 99%   BMI 24.59 kg/m²     Physical Exam  Vitals reviewed.   Constitutional:       General: " She is not in acute distress.     Appearance: She is well-developed and normal weight.   HENT:      Head: Normocephalic and atraumatic.      Right Ear: Tympanic membrane, ear canal and external ear normal.      Left Ear: Tympanic membrane, ear canal and external ear normal.      Nose: No congestion.      Mouth/Throat:      Mouth: Mucous membranes are moist.      Pharynx: Oropharynx is clear.   Eyes:      Extraocular Movements: Extraocular movements intact.      Pupils: Pupils are equal, round, and reactive to light.   Cardiovascular:      Rate and Rhythm: Normal rate and regular rhythm.      Heart sounds: Normal heart sounds. No murmur heard.  Pulmonary:      Effort: Pulmonary effort is normal. No respiratory distress.      Breath sounds: Normal breath sounds.   Abdominal:      General: Bowel sounds are normal.      Palpations: Abdomen is soft.      Tenderness: There is no abdominal tenderness.   Musculoskeletal:      Cervical back: Neck supple.      Right lower leg: No edema.      Left lower leg: No edema.   Skin:     General: Skin is warm.      Capillary Refill: Capillary refill takes less than 2 seconds.      Coloration: Skin is not cyanotic.      Findings: No rash.   Neurological:      General: No focal deficit present.      Mental Status: She is alert.   Psychiatric:         Mood and Affect: Mood normal.          Anna Christopher MD  Family Medicine PGY-3  [facility]

## 2024-05-29 DIAGNOSIS — E78.00 HYPERCHOLESTEROLEMIA: ICD-10-CM

## 2024-05-29 LAB
ALBUMIN SERPL-MCNC: 4.4 G/DL (ref 3.8–4.9)
ALBUMIN/GLOB SERPL: 1.9 {RATIO} (ref 1.2–2.2)
ALP SERPL-CCNC: 73 IU/L (ref 44–121)
ALT SERPL-CCNC: 11 IU/L (ref 0–32)
AST SERPL-CCNC: 16 IU/L (ref 0–40)
BASOPHILS # BLD AUTO: 0 X10E3/UL (ref 0–0.2)
BASOPHILS NFR BLD AUTO: 1 %
BILIRUB SERPL-MCNC: 0.3 MG/DL (ref 0–1.2)
BUN SERPL-MCNC: 12 MG/DL (ref 6–24)
BUN/CREAT SERPL: 15 (ref 9–23)
CALCIUM SERPL-MCNC: 9.5 MG/DL (ref 8.7–10.2)
CHLORIDE SERPL-SCNC: 104 MMOL/L (ref 96–106)
CHOLEST SERPL-MCNC: 255 MG/DL (ref 100–199)
CO2 SERPL-SCNC: 22 MMOL/L (ref 20–29)
CREAT SERPL-MCNC: 0.8 MG/DL (ref 0.57–1)
EGFR: 86 ML/MIN/1.73
EOSINOPHIL # BLD AUTO: 0.1 X10E3/UL (ref 0–0.4)
EOSINOPHIL NFR BLD AUTO: 1 %
ERYTHROCYTE [DISTWIDTH] IN BLOOD BY AUTOMATED COUNT: 12.5 % (ref 11.7–15.4)
GLOBULIN SER-MCNC: 2.3 G/DL (ref 1.5–4.5)
GLUCOSE SERPL-MCNC: 94 MG/DL (ref 70–99)
HCT VFR BLD AUTO: 38.5 % (ref 34–46.6)
HDLC SERPL-MCNC: 55 MG/DL
HGB BLD-MCNC: 12.9 G/DL (ref 11.1–15.9)
IMM GRANULOCYTES # BLD: 0 X10E3/UL (ref 0–0.1)
IMM GRANULOCYTES NFR BLD: 0 %
LDLC SERPL CALC-MCNC: 178 MG/DL (ref 0–99)
LDLC/HDLC SERPL: 3.2 RATIO (ref 0–3.2)
LYMPHOCYTES # BLD AUTO: 1.7 X10E3/UL (ref 0.7–3.1)
LYMPHOCYTES NFR BLD AUTO: 30 %
MCH RBC QN AUTO: 29.8 PG (ref 26.6–33)
MCHC RBC AUTO-ENTMCNC: 33.5 G/DL (ref 31.5–35.7)
MCV RBC AUTO: 89 FL (ref 79–97)
MONOCYTES # BLD AUTO: 0.5 X10E3/UL (ref 0.1–0.9)
MONOCYTES NFR BLD AUTO: 9 %
NEUTROPHILS # BLD AUTO: 3.4 X10E3/UL (ref 1.4–7)
NEUTROPHILS NFR BLD AUTO: 59 %
PLATELET # BLD AUTO: 205 X10E3/UL (ref 150–450)
POTASSIUM SERPL-SCNC: 3.7 MMOL/L (ref 3.5–5.2)
PROT SERPL-MCNC: 6.7 G/DL (ref 6–8.5)
RBC # BLD AUTO: 4.33 X10E6/UL (ref 3.77–5.28)
SL AMB VLDL CHOLESTEROL CALC: 22 MG/DL (ref 5–40)
SODIUM SERPL-SCNC: 141 MMOL/L (ref 134–144)
TRIGL SERPL-MCNC: 124 MG/DL (ref 0–149)
TSH SERPL DL<=0.005 MIU/L-ACNC: 1.54 UIU/ML (ref 0.45–4.5)
WBC # BLD AUTO: 5.7 X10E3/UL (ref 3.4–10.8)

## 2024-05-29 RX ORDER — ATORVASTATIN CALCIUM 20 MG/1
20 TABLET, FILM COATED ORAL DAILY
Qty: 90 TABLET | Refills: 1 | Status: SHIPPED | OUTPATIENT
Start: 2024-05-29

## 2024-06-18 ENCOUNTER — OFFICE VISIT (OUTPATIENT)
Age: 59
End: 2024-06-18

## 2024-06-18 VITALS
DIASTOLIC BLOOD PRESSURE: 77 MMHG | HEART RATE: 86 BPM | TEMPERATURE: 98.6 F | BODY MASS INDEX: 24.62 KG/M2 | SYSTOLIC BLOOD PRESSURE: 123 MMHG | WEIGHT: 139 LBS | OXYGEN SATURATION: 98 %

## 2024-06-18 DIAGNOSIS — R41.3 MEMORY PROBLEM: ICD-10-CM

## 2024-06-18 DIAGNOSIS — E78.00 HYPERCHOLESTEROLEMIA: Primary | ICD-10-CM

## 2024-06-18 PROCEDURE — 99213 OFFICE O/P EST LOW 20 MIN: CPT | Performed by: FAMILY MEDICINE

## 2024-06-18 NOTE — PROGRESS NOTES
Family Medicine Office Visit  Brissa Kowalski 58 y.o. female   MRN: [id number] : 1965  ENCOUNTER: 2024    Assessment and Plan     1. Hypercholesterolemia  Assessment & Plan:  Component      Latest Ref Rng 2024   Cholesterol      100 - 199 mg/dL 255 (H)    Triglycerides      0 - 149 mg/dL 124    HDL      >39 mg/dL 55    VLDL Cholesterol Dipak      5 - 40 mg/dL 22    LDL Calculated      0 - 99 mg/dL 178 (H)    LDL/HDL RATIO      0.0 - 3.2 ratio 3.2       Patient presenting for discussion of lab results.  Last lipid panel as above.  LDL and total cholesterol have been consistently increasing over the past 4 years.  Patient on atorvastatin 20 mg daily but she does not take medication.  Recommended patient start taking Lipitor 20 mg daily and to continue active healthy lifestyle.  All other lab results are normal.  2. Memory problem  Assessment & Plan:  Patient reports that her  has noticed she is getting more more forgetful.  Patient states that she has many things to remember and sometimes she forgets things  She performs all ADLs and ADLs without assistance  She would like to have testing done to make sure that everything is okay  RTO for MoCA testing      Return in about 4 weeks (around 2024), or Memory /MOCA testing- OVL with Dr. De Oliveira.      Associated orders were discussed and explained to the patient, they expressed understanding and acceptance with A/P. Patient will call the office if any further questions/concerns.     Assessment and plan was discussed with attending physician    Chief Complaint     Chief Complaint   Patient presents with    Follow-up     Test results       History of Present Illness     Brissa Kowalski is a 58 y.o.-year-old female who presents today for review of lab results.  Refer to A/P for more details.    Patient is also requesting memory testing.  She reports that her  has noticed she is getting more more forgetful.  Patient states that she has many  things to remember and sometimes she forgets things but since her  has mentioned it many times she would like to get tested.  Performs all ADLs and IADLs without any assistance.      Current Outpatient Medications on File Prior to Visit   Medication Sig    Ascorbic Acid (VITAMIN C) 100 MG tablet Take 100 mg by mouth daily    atorvastatin (LIPITOR) 20 mg tablet Take 1 tablet (20 mg total) by mouth daily    Azelastine HCl 137 MCG/SPRAY SOLN USE 1 SPRAY(S) IN EACH NOSTRIL TWICE DAILY    cetirizine (ZyrTEC) 10 mg tablet Take 1 tablet (10 mg total) by mouth daily    EVENING PRIMROSE OIL PO CAPS    fluticasone (FLONASE) 50 mcg/act nasal spray USE 1 SPRAY(S) IN EACH NOSTRIL TWICE DAILY    Multiple Vitamin (MULTIVITAMIN) tablet Take 1 tablet by mouth daily    SUMAtriptan (IMITREX) 50 mg tablet TAKE 1 TABLET BY MOUTH ONCE AS NEEDED FOR MIGRAINE HEADACHE    thiamine (VITAMIN B1) 100 mg tablet Take 100 mg by mouth daily    clindamycin (CLEOCIN) 300 MG capsule Take 300 mg by mouth 3 (three) times a day (Patient not taking: Reported on 6/18/2024)    meclizine (ANTIVERT) 25 mg tablet Take 1 tablet (25 mg total) by mouth 3 (three) times a day as needed for dizziness (Patient not taking: Reported on 6/18/2024)       Review of Systems     Review of Systems   Constitutional:  Negative for chills and fever.   Respiratory:  Negative for cough and shortness of breath.    Cardiovascular:  Negative for chest pain.   Gastrointestinal:  Negative for abdominal pain.   Genitourinary:  Negative for difficulty urinating.   Skin:  Negative for rash.   Neurological:  Negative for headaches.       Objective     /77 (BP Location: Right arm, Patient Position: Sitting, Cuff Size: Standard)   Pulse 86   Temp 98.6 °F (37 °C) (Tympanic)   Wt 63 kg (139 lb)   SpO2 98%   BMI 24.62 kg/m²     Physical Exam  Vitals reviewed.   Constitutional:       General: She is not in acute distress.     Appearance: Normal appearance.   Cardiovascular:       Rate and Rhythm: Normal rate and regular rhythm.      Heart sounds: Normal heart sounds.   Pulmonary:      Effort: Pulmonary effort is normal. No respiratory distress.      Breath sounds: Normal breath sounds.   Abdominal:      Palpations: Abdomen is soft.      Tenderness: There is no abdominal tenderness.   Musculoskeletal:      Right lower leg: No edema.      Left lower leg: No edema.   Neurological:      Mental Status: She is alert.       Component      Latest Ref Rng 5/28/2024   Cholesterol      100 - 199 mg/dL 255 (H)    Triglycerides      0 - 149 mg/dL 124    HDL      >39 mg/dL 55    VLDL Cholesterol Dipak      5 - 40 mg/dL 22    LDL Calculated      0 - 99 mg/dL 178 (H)    LDL/HDL RATIO      0.0 - 3.2 ratio 3.2         Anna Christopher MD  Family Medicine PGY-3  [facility]         Parts of this note were dictated using M*Modal dictation software and may have sounds-like errors due to variation in pronunciation.

## 2024-06-22 PROBLEM — R41.3 MEMORY PROBLEM: Status: ACTIVE | Noted: 2024-06-22

## 2024-06-22 NOTE — ASSESSMENT & PLAN NOTE
Component      Latest Ref Rng 5/28/2024   Cholesterol      100 - 199 mg/dL 255 (H)    Triglycerides      0 - 149 mg/dL 124    HDL      >39 mg/dL 55    VLDL Cholesterol Dipak      5 - 40 mg/dL 22    LDL Calculated      0 - 99 mg/dL 178 (H)    LDL/HDL RATIO      0.0 - 3.2 ratio 3.2       Patient presenting for discussion of lab results.  Last lipid panel as above.  LDL and total cholesterol have been consistently increasing over the past 4 years.  Patient on atorvastatin 20 mg daily but she does not take medication.  Recommended patient start taking Lipitor 20 mg daily and to continue active healthy lifestyle.  All other lab results are normal.

## 2024-06-22 NOTE — ASSESSMENT & PLAN NOTE
Patient reports that her  has noticed she is getting more more forgetful.  Patient states that she has many things to remember and sometimes she forgets things  She performs all ADLs and ADLs without assistance  She would like to have testing done to make sure that everything is okay  RTO for MoCA testing

## 2024-07-19 ENCOUNTER — OFFICE VISIT (OUTPATIENT)
Age: 59
End: 2024-07-19

## 2024-07-19 VITALS
OXYGEN SATURATION: 98 % | BODY MASS INDEX: 24.86 KG/M2 | HEIGHT: 63 IN | TEMPERATURE: 97.8 F | DIASTOLIC BLOOD PRESSURE: 82 MMHG | SYSTOLIC BLOOD PRESSURE: 132 MMHG | WEIGHT: 140.3 LBS | HEART RATE: 80 BPM

## 2024-07-19 DIAGNOSIS — Z11.4 SCREENING FOR HIV (HUMAN IMMUNODEFICIENCY VIRUS): ICD-10-CM

## 2024-07-19 DIAGNOSIS — R41.3 MEMORY PROBLEM: Primary | ICD-10-CM

## 2024-07-19 PROCEDURE — 99213 OFFICE O/P EST LOW 20 MIN: CPT | Performed by: FAMILY MEDICINE

## 2024-07-19 NOTE — PROGRESS NOTES
Ambulatory Visit  Name: Brissa Kowalski      : 1965      MRN: [id number]  Encounter Provider: Parish Wisdom MD  Encounter Date: 2024   Encounter department: [facility]    Assessment & Plan   1. Memory problem  Assessment & Plan:   worried about memory issues at last visit  - Independently performs ADL's/IADL's  - Works 6 days a week as . Exercises regularly  - Presented to office by herself    MOCA 20/30 (Would give 20-22 for language barrier, speaks [place] natively)  - Suggestive of mild cognitive impairment  - Lives with  and remains active and functional  - Normal attention and speech. Calm cooperative and appropriate during evaluation    Plan:  - Will check Vitamin B12 and HIV.   - TSH, CBC, and CMP recently WNL.  - Continue social support  - Follow up lab results  Orders:  -     Vitamin B12; Future  -     Vitamin B12  2. Screening for HIV (human immunodeficiency virus)  -     HIV 1/2 AG/AB w Reflex [facility] for 2 yr old and above; Future     History of Present Illness   {Disappearing Hyperlinks I Encounters * My Last Note * Since Last Visit * History :20733}  Presents as follow up for memory issues for MOCA exam  - Here by herself. States  is worried more than she is. Admits she sometimes forgets things as soon as people say them.   - Works 6 days a week . Exercises regularly. Drives. Performs ADL's independently. Not great at math.   - Denies falls or urinary symptoms        Review of Systems   Constitutional:  Negative for fever.   Respiratory:  Negative for shortness of breath.    Cardiovascular:  Negative for chest pain.   Gastrointestinal:  Negative for abdominal pain.   Genitourinary:  Negative for difficulty urinating.   Musculoskeletal:  Negative for gait problem.   Neurological:  Negative for dizziness, syncope, weakness and headaches.   Psychiatric/Behavioral:  Positive for decreased concentration.    All  "other systems reviewed and are negative.    Medical History Reviewed by provider this encounter:  Tobacco  Allergies  Meds  Problems  Med Hx  Surg Hx  Fam Hx       Objective   {Disappearing Hyperlinks   Review Vitals * Enter New Vitals * Results Review * Labs * Imaging * Cardiology * Procedures * Lung Cancer Screening :04493}  /82 (BP Location: Left arm, Patient Position: Sitting, Cuff Size: Standard)   Pulse 80   Temp 97.8 °F (36.6 °C) (Tympanic)   Ht 5' 3\" (1.6 m)   Wt 63.6 kg (140 lb 4.8 oz)   SpO2 98%   BMI 24.85 kg/m²     Physical Exam  Vitals reviewed.   Constitutional:       Appearance: Normal appearance.   Cardiovascular:      Rate and Rhythm: Normal rate and regular rhythm.      Pulses: Normal pulses.   Pulmonary:      Effort: Pulmonary effort is normal.      Breath sounds: Normal breath sounds.   Abdominal:      Palpations: Abdomen is soft.      Tenderness: There is no abdominal tenderness.   Neurological:      Mental Status: She is alert and oriented to person, place, and time.   Psychiatric:         Mood and Affect: Mood normal.         Behavior: Behavior normal.         Thought Content: Thought content normal.      Comments: Calm cooperative. Attentive. Normal speech and appropriate behavior       Administrative Statements {Disappearing Hyperlinks I  Level of Service * [facility]/PCSP:63593}    "

## 2024-07-19 NOTE — ASSESSMENT & PLAN NOTE
worried about memory issues at last visit  - Independently performs ADL's/IADL's  - Works 6 days a week as . Exercises regularly  - Presented to office by herself    MOCA 20/30 (Would give 20-22 for language barrier, speaks Estonian natively)  - Suggestive of mild cognitive impairment  - Lives with  and remains active and functional  - Normal attention and speech. Calm cooperative and appropriate during evaluation    Plan:  - Will check Vitamin B12 and HIV.   - TSH, CBC, and CMP recently WNL.  - Continue social support  - Follow up lab results

## 2024-07-25 LAB
HIV 1+2 AB+HIV1 P24 AG SERPL QL IA: NON REACTIVE
VIT B12 SERPL-MCNC: 782 PG/ML (ref 232–1245)

## 2024-10-04 ENCOUNTER — OFFICE VISIT (OUTPATIENT)
Age: 59
End: 2024-10-04

## 2024-10-04 VITALS
DIASTOLIC BLOOD PRESSURE: 77 MMHG | TEMPERATURE: 97.5 F | WEIGHT: 140 LBS | HEIGHT: 63 IN | BODY MASS INDEX: 24.8 KG/M2 | SYSTOLIC BLOOD PRESSURE: 123 MMHG | HEART RATE: 72 BPM | OXYGEN SATURATION: 99 %

## 2024-10-04 DIAGNOSIS — Z12.11 COLON CANCER SCREENING: ICD-10-CM

## 2024-10-04 DIAGNOSIS — H92.03 OTALGIA OF BOTH EARS: Primary | ICD-10-CM

## 2024-10-04 PROCEDURE — 99214 OFFICE O/P EST MOD 30 MIN: CPT | Performed by: FAMILY MEDICINE

## 2024-10-04 RX ORDER — TRIAMCINOLONE ACETONIDE 1 MG/G
CREAM TOPICAL 2 TIMES DAILY
Qty: 15 G | Refills: 0 | Status: SHIPPED | OUTPATIENT
Start: 2024-10-04

## 2024-10-04 NOTE — PROGRESS NOTES
"Ambulatory Visit  Name: Brissa Kowalski      : 1965      MRN: [id number]  Encounter Provider: Kenan Mariano MD  Encounter Date: 10/4/2024   Encounter department: [facility]    Assessment & Plan  Otalgia of both ears  Presents with 2 weeks of bilateral ear pain for past two weeks. Reports L worse than R. On physical exam, scaly eczematic vs psoriatic plaque visible on external ear of R ear. No prior atopic history. Denies any recent new exposures (ie. Creams, lotions, soaps)  Starting steroid cream BID  Advised to return if symptoms persist or worsen for possible dermatology referral    Orders:    triamcinolone (KENALOG) 0.1 % cream; Apply topically 2 (two) times a day    Colon cancer screening    Orders:    Ambulatory Referral to Gastroenterology; Future       History of Present Illness     Patient seen and examined in office for evaluation of bilateral external ear pain with associated rash.           Review of Systems   Constitutional:  Negative for chills and fever.   HENT:  Positive for ear pain (with rash). Negative for ear discharge, hearing loss and sore throat.    Eyes:  Negative for pain, discharge, itching and visual disturbance.   Respiratory:  Negative for cough and shortness of breath.    Cardiovascular:  Negative for chest pain and palpitations.   Gastrointestinal:  Negative for abdominal pain and vomiting.   Genitourinary:  Negative for dysuria and hematuria.   Musculoskeletal:  Negative for arthralgias and back pain.   Skin:  Negative for color change and rash.   Neurological:  Negative for seizures and syncope.   All other systems reviewed and are negative.          Objective     /77 (BP Location: Left arm, Patient Position: Sitting, Cuff Size: Standard)   Pulse 72   Temp 97.5 °F (36.4 °C) (Tympanic)   Ht 5' 3\" (1.6 m)   Wt 63.5 kg (140 lb)   SpO2 99%   BMI 24.80 kg/m²     Physical Exam  Vitals and nursing note reviewed.   Constitutional:  "      General: She is not in acute distress.     Appearance: She is well-developed.   HENT:      Head: Normocephalic and atraumatic.      Right Ear: Tympanic membrane and ear canal normal. There is no impacted cerumen.      Left Ear: Tympanic membrane and ear canal normal. There is no impacted cerumen.      Ears:      Comments: Eczematous-like rash on R cymba martin and L posterior auricular areas     Nose: Nose normal.      Mouth/Throat:      Mouth: Mucous membranes are moist.      Pharynx: Oropharynx is clear.   Eyes:      Extraocular Movements: Extraocular movements intact.      Conjunctiva/sclera: Conjunctivae normal.      Pupils: Pupils are equal, round, and reactive to light.   Cardiovascular:      Rate and Rhythm: Normal rate and regular rhythm.      Heart sounds: No murmur heard.  Pulmonary:      Effort: Pulmonary effort is normal. No respiratory distress.      Breath sounds: Normal breath sounds.   Abdominal:      Palpations: Abdomen is soft.      Tenderness: There is no abdominal tenderness.   Musculoskeletal:         General: No swelling.      Cervical back: Neck supple.   Skin:     General: Skin is warm and dry.      Capillary Refill: Capillary refill takes less than 2 seconds.   Neurological:      Mental Status: She is alert.   Psychiatric:         Mood and Affect: Mood normal.

## 2024-11-26 ENCOUNTER — TELEPHONE (OUTPATIENT)
Age: 59
End: 2024-11-26

## 2024-11-26 DIAGNOSIS — Z12.31 ENCOUNTER FOR SCREENING MAMMOGRAM FOR BREAST CANCER: ICD-10-CM

## 2024-11-26 NOTE — TELEPHONE ENCOUNTER
Annalee -  patient called back regarding her referral that you previously called about.  Her call back # is:  [phone]

## 2024-11-26 NOTE — TELEPHONE ENCOUNTER
Patient came in office stating that she is due for he next mammogram. Dr. De Oliveira are you able to place that order?

## 2025-01-21 ENCOUNTER — CONSULT (OUTPATIENT)
Age: 60
End: 2025-01-21
Payer: COMMERCIAL

## 2025-01-21 VITALS
DIASTOLIC BLOOD PRESSURE: 84 MMHG | BODY MASS INDEX: 24.8 KG/M2 | WEIGHT: 140 LBS | SYSTOLIC BLOOD PRESSURE: 143 MMHG | OXYGEN SATURATION: 98 % | HEIGHT: 63 IN | HEART RATE: 81 BPM

## 2025-01-21 DIAGNOSIS — Z12.11 COLON CANCER SCREENING: ICD-10-CM

## 2025-01-21 DIAGNOSIS — D12.6 TUBULAR ADENOMA OF COLON: Primary | ICD-10-CM

## 2025-01-21 PROCEDURE — 99243 OFF/OP CNSLTJ NEW/EST LOW 30: CPT | Performed by: INTERNAL MEDICINE

## 2025-01-21 RX ORDER — SODIUM CHLORIDE, SODIUM LACTATE, POTASSIUM CHLORIDE, CALCIUM CHLORIDE 600; 310; 30; 20 MG/100ML; MG/100ML; MG/100ML; MG/100ML
125 INJECTION, SOLUTION INTRAVENOUS CONTINUOUS
OUTPATIENT
Start: 2025-01-21

## 2025-01-21 NOTE — PROGRESS NOTES
Name: Brissa Kowalski      : 1965      MRN: [id number]  Encounter Provider: Vandana Giron MD  Encounter Date: 2025   Encounter department: [facility] [place]  :  Assessment & Plan  Colon cancer screening  -Increased risk due to personal history of adenomatous polyp.  -Will schedule colonoscopy today.  -Recommend GoLytely/Dulcolax bowel prep.  -I obtained informed consent from the patient. The risks/benefits/alternatives of the procedure were discussed with the patient. Risks included, but not limited to, infection, bleeding, perforation, injury to organs in the abdomen, missed lesion and incomplete procedure were discussed. Patient was agreeable and electronic consent was signed.    Orders:    Ambulatory Referral to Gastroenterology    Colonoscopy; Future    polyethylene glycol (GOLYTELY) 4000 mL solution; Take 4,000 mL by mouth once for 1 dose    Tubular adenoma of colon  -Patient is overdue for colonoscopy at this time.  -Patient was explained about the risks and benefits of the procedure. Risks including but not limited to bleeding, infection, perforation were explained in detail. Also explained about less than 100% sensitivity with the exam and other alternatives.  -Recommend GoLytely/Dulcolax bowel prep  Orders:    Colonoscopy; Future    polyethylene glycol (GOLYTELY) 4000 mL solution; Take 4,000 mL by mouth once for 1 dose        History of Present Illness   HPI  Brissa Kowalski is a 59 y.o. female with history of adenomatous colon polyps, presents for colon cancer screening evaluation.  Last colonoscopy was in 2019 with transverse colon adenomatous polyp.  Patient presents today to schedule colonoscopy, she denies any change in bowel habits, hematochezia, abdominal pain or unintentional weight loss.  No symptoms of acid reflux, dysphagia, loss of appetite or early satiety.  No melena.    She is not on any antiplatelets or anticoagulants.  Blood work notable for  "normal CBC, normal CMP and normal TSH in 2024.        Review of Systems   Constitutional: Negative.  Negative for chills and fever.   HENT: Negative.  Negative for ear pain and sore throat.    Eyes: Negative.  Negative for pain and visual disturbance.   Respiratory: Negative.  Negative for cough and shortness of breath.    Cardiovascular: Negative.  Negative for chest pain and palpitations.   Gastrointestinal:  Negative for abdominal pain and vomiting.        See HPI.   Endocrine: Negative.    Genitourinary: Negative.  Negative for dysuria and hematuria.   Musculoskeletal: Negative.  Negative for arthralgias and back pain.   Skin: Negative.  Negative for color change and rash.   Allergic/Immunologic: Negative.    Neurological: Negative.  Negative for seizures and syncope.   Hematological: Negative.    Psychiatric/Behavioral: Negative.     All other systems reviewed and are negative.         Objective   /84 (BP Location: Left arm, Patient Position: Sitting, Cuff Size: Standard)   Pulse 81   Ht 5' 3\" (1.6 m)   Wt 63.5 kg (140 lb)   SpO2 98%   BMI 24.80 kg/m²      Physical Exam      "

## 2025-01-21 NOTE — ASSESSMENT & PLAN NOTE
-Patient is overdue for colonoscopy at this time.  -Patient was explained about the risks and benefits of the procedure. Risks including but not limited to bleeding, infection, perforation were explained in detail. Also explained about less than 100% sensitivity with the exam and other alternatives.  -Recommend GoLytely/Dulcolax bowel prep  Orders:    Colonoscopy; Future    polyethylene glycol (GOLYTELY) 4000 mL solution; Take 4,000 mL by mouth once for 1 dose

## 2025-01-21 NOTE — PATIENT INSTRUCTIONS
Scheduled date of colonoscopy (as of today): 3/19/25  Physician performing colonoscopy: Dr. Giron  Location of colonoscopy: [facility]  Bowel prep reviewed with patient: Golytely  Instructions reviewed with patient by: N.M.  Clearances: LUCIO

## 2025-03-05 ENCOUNTER — ANESTHESIA EVENT (OUTPATIENT)
Dept: ANESTHESIOLOGY | Facility: HOSPITAL | Age: 60
End: 2025-03-05

## 2025-03-05 ENCOUNTER — ANESTHESIA (OUTPATIENT)
Dept: ANESTHESIOLOGY | Facility: HOSPITAL | Age: 60
End: 2025-03-05

## 2025-03-05 ENCOUNTER — HOSPITAL ENCOUNTER (OUTPATIENT)
Dept: RADIOLOGY | Facility: HOSPITAL | Age: 60
Discharge: HOME | End: 2025-03-05
Payer: COMMERCIAL

## 2025-03-05 VITALS — BODY MASS INDEX: 24.8 KG/M2 | WEIGHT: 140 LBS | HEIGHT: 63 IN

## 2025-03-05 DIAGNOSIS — Z12.31 ENCOUNTER FOR SCREENING MAMMOGRAM FOR BREAST CANCER: ICD-10-CM

## 2025-03-05 PROCEDURE — 77067 SCR MAMMO BI INCL CAD: CPT

## 2025-03-05 PROCEDURE — 77063 BREAST TOMOSYNTHESIS BI: CPT

## 2025-03-12 ENCOUNTER — TELEPHONE (OUTPATIENT)
Dept: GASTROENTEROLOGY | Facility: AMBULARY SURGERY CENTER | Age: 60
End: 2025-03-12

## 2025-03-12 NOTE — TELEPHONE ENCOUNTER
Lm for pt to call back. Called pt to confirm upcoming colonoscopy with Dr. Giron 3/19/25 @ [facility].

## 2025-03-18 RX ORDER — SODIUM CHLORIDE, SODIUM LACTATE, POTASSIUM CHLORIDE, CALCIUM CHLORIDE 600; 310; 30; 20 MG/100ML; MG/100ML; MG/100ML; MG/100ML
75 INJECTION, SOLUTION INTRAVENOUS CONTINUOUS
Status: CANCELLED | OUTPATIENT
Start: 2025-03-18

## 2025-03-19 ENCOUNTER — ANESTHESIA EVENT (OUTPATIENT)
Dept: GASTROENTEROLOGY | Facility: AMBULARY SURGERY CENTER | Age: 60
End: 2025-03-19
Payer: COMMERCIAL

## 2025-03-19 ENCOUNTER — HOSPITAL ENCOUNTER (OUTPATIENT)
Dept: GASTROENTEROLOGY | Facility: AMBULARY SURGERY CENTER | Age: 60
Setting detail: OUTPATIENT SURGERY
Discharge: HOME | End: 2025-03-19
Attending: INTERNAL MEDICINE
Payer: COMMERCIAL

## 2025-03-19 VITALS
OXYGEN SATURATION: 100 % | HEART RATE: 75 BPM | SYSTOLIC BLOOD PRESSURE: 117 MMHG | TEMPERATURE: 97.2 F | DIASTOLIC BLOOD PRESSURE: 74 MMHG | RESPIRATION RATE: 18 BRPM

## 2025-03-19 DIAGNOSIS — Z12.11 COLON CANCER SCREENING: ICD-10-CM

## 2025-03-19 DIAGNOSIS — D12.6 TUBULAR ADENOMA OF COLON: ICD-10-CM

## 2025-03-19 PROCEDURE — 45385 COLONOSCOPY W/LESION REMOVAL: CPT | Performed by: INTERNAL MEDICINE

## 2025-03-19 PROCEDURE — 88305 TISSUE EXAM BY PATHOLOGIST: CPT | Performed by: SPECIALIST

## 2025-03-19 RX ORDER — PROPOFOL 10 MG/ML
INJECTION, EMULSION INTRAVENOUS CONTINUOUS PRN
Status: DISCONTINUED | OUTPATIENT
Start: 2025-03-19 | End: 2025-03-19

## 2025-03-19 RX ORDER — SODIUM CHLORIDE, SODIUM LACTATE, POTASSIUM CHLORIDE, CALCIUM CHLORIDE 600; 310; 30; 20 MG/100ML; MG/100ML; MG/100ML; MG/100ML
75 INJECTION, SOLUTION INTRAVENOUS CONTINUOUS
Status: DISCONTINUED | OUTPATIENT
Start: 2025-03-19 | End: 2025-03-23 | Stop reason: HOSPADM

## 2025-03-19 RX ORDER — SODIUM CHLORIDE, SODIUM LACTATE, POTASSIUM CHLORIDE, CALCIUM CHLORIDE 600; 310; 30; 20 MG/100ML; MG/100ML; MG/100ML; MG/100ML
INJECTION, SOLUTION INTRAVENOUS CONTINUOUS PRN
Status: DISCONTINUED | OUTPATIENT
Start: 2025-03-19 | End: 2025-03-19

## 2025-03-19 RX ORDER — PROPOFOL 10 MG/ML
INJECTION, EMULSION INTRAVENOUS AS NEEDED
Status: DISCONTINUED | OUTPATIENT
Start: 2025-03-19 | End: 2025-03-19

## 2025-03-19 RX ORDER — SODIUM CHLORIDE, SODIUM LACTATE, POTASSIUM CHLORIDE, CALCIUM CHLORIDE 600; 310; 30; 20 MG/100ML; MG/100ML; MG/100ML; MG/100ML
125 INJECTION, SOLUTION INTRAVENOUS CONTINUOUS
Status: DISCONTINUED | OUTPATIENT
Start: 2025-03-19 | End: 2025-03-23 | Stop reason: HOSPADM

## 2025-03-19 RX ORDER — LIDOCAINE HYDROCHLORIDE 10 MG/ML
INJECTION, SOLUTION EPIDURAL; INFILTRATION; INTRACAUDAL; PERINEURAL AS NEEDED
Status: DISCONTINUED | OUTPATIENT
Start: 2025-03-19 | End: 2025-03-19

## 2025-03-19 RX ADMIN — SODIUM CHLORIDE, SODIUM LACTATE, POTASSIUM CHLORIDE, AND CALCIUM CHLORIDE 75 ML/HR: .6; .31; .03; .02 INJECTION, SOLUTION INTRAVENOUS at 07:24

## 2025-03-19 RX ADMIN — PROPOFOL 100 MCG/KG/MIN: 10 INJECTION, EMULSION INTRAVENOUS at 08:36

## 2025-03-19 RX ADMIN — SODIUM CHLORIDE, SODIUM LACTATE, POTASSIUM CHLORIDE, AND CALCIUM CHLORIDE: .6; .31; .03; .02 INJECTION, SOLUTION INTRAVENOUS at 08:26

## 2025-03-19 RX ADMIN — LIDOCAINE HYDROCHLORIDE 50 MG: 10 INJECTION, SOLUTION EPIDURAL; INFILTRATION; INTRACAUDAL; PERINEURAL at 08:35

## 2025-03-19 RX ADMIN — PROPOFOL 20 MG: 10 INJECTION, EMULSION INTRAVENOUS at 08:37

## 2025-03-19 RX ADMIN — PROPOFOL 100 MG: 10 INJECTION, EMULSION INTRAVENOUS at 08:35

## 2025-03-19 NOTE — PERIOPERATIVE NURSING NOTE
Bedside report given to Abby VILLALOBOS RN . Bed in lowest locked position with both rails up. Call bell within reach.

## 2025-03-19 NOTE — ANESTHESIA POSTPROCEDURE EVALUATION
Post-Op Assessment Note    CV Status:  Stable  Pain Score: 0    Pain management: adequate       Mental Status:  Arousable   Hydration Status:  Stable   PONV Controlled:  Controlled   Airway Patency:  Patent  Two or more mitigation strategies used for obstructive sleep apnea   Post Op Vitals Reviewed: Yes    No anethesia notable event occurred.    Staff: CRNA           Last Filed PACU Vitals:  Vitals Value Taken Time   Temp     Pulse 76    /68    Resp 14    SpO2 99

## 2025-03-19 NOTE — H&P
History and Physical -  [facility]  Brissa Kowalski 59 y.o. female MRN: [id number]    HPI: Brissa Kowalski is a 59 y.o. year old female who presents for colon cancer screening evaluation, has had history of polyps.      Review of Systems    Historical Information   Past Medical History:   Diagnosis Date    Allergic rhinitis     Colon polyp     History of screening mammography     last assessed: 10/28/2013    Hyperlipidemia     Left lumbar radiculopathy     last assessed: 08/02/2017    Migraine     Mitral valve regurgitation     trace, by prior echocardiogram    Tricuspid regurgitation     trace, by prior echocardiogram    Vagina bleeding     last assessed: 12/08/2014     Past Surgical History:   Procedure Laterality Date    COLONOSCOPY  09/19/2019    EPIDURAL BLOCK INJECTION Right 01/18/2018    Procedure: TRANSFORAMINAL L5-S1;  Surgeon: Ya Oreilly MD;  Location: [facility] MAIN OR;  Service: Pain Management     HYSTERECTOMY  2014     Social History   Social History     Substance and Sexual Activity   Alcohol Use Not Currently     Social History     Substance and Sexual Activity   Drug Use No     Social History     Tobacco Use   Smoking Status Never   Smokeless Tobacco Never     Family History   Problem Relation Age of Onset    Heart disease Family     Hypertension Family     Thyroid disease Family     Melanoma Other         malignant, of the skin    Hypertension Mother     Hyperlipidemia Mother     Heart disease Mother     Liver cancer Mother     Hypertension Sister     Hyperlipidemia Sister     No Known Problems Son     Leukemia Maternal Aunt     No Known Problems Paternal Aunt        Meds/Allergies     Not in a hospital admission.    Allergies   Allergen Reactions    Penicillins      As a child unsure of reaction       Objective     /84   Pulse 97   Temp (!) 97.2 °F (36.2 °C) (Temporal)   Resp 16   SpO2 100%       PHYSICAL EXAM    Gen: NAD  CV: RRR  CHEST: Clear  ABD: soft, NT/ND  EXT:  no edema  Neuro: AAO      ASSESSMENT/PLAN:  This is a 59 y.o. year old female here for colon cancer screening, has had a history of polyps.  Patient was explained about the risks and benefits of the procedure. Risks including but not limited to bleeding, infection, perforation were explained in detail.     PLAN:   Procedure: Colonoscopy.

## 2025-03-19 NOTE — ANESTHESIA PREPROCEDURE EVALUATION
Procedure:  COLONOSCOPY    Relevant Problems   CARDIO   (+) Hypercholesterolemia (No meds)   (+) Migraine      GI/HEPATIC   (+) Esophageal reflux      NEURO/PSYCH   (+) Chronic pain disorder   (+) Migraine        Physical Exam    Airway    Mallampati score: II  TM Distance: >3 FB  Neck ROM: full     Dental       Cardiovascular  Rhythm: regular, Rate: normal    Pulmonary   Breath sounds clear to auscultation    Other Findings  post-pubertal.      Anesthesia Plan  ASA Score- 2     Anesthesia Type- IV sedation with anesthesia with ASA Monitors.         Additional Monitors:     Airway Plan:     Comment: Black coffee at 6:30 am - ok for procedure at 8:30 am.       Plan Factors-    Chart reviewed.        Patient is not a current smoker.              Induction- intravenous.    Postoperative Plan-     Perioperative Resuscitation Plan - Level 1 - Full Code.       Informed Consent- Anesthetic plan and risks discussed with patient.  I personally reviewed this patient with the CRNA. Discussed and agreed on the Anesthesia Plan with the CRNA..      NPO Status:  Vitals Value Taken Time   Date of last liquid 03/19/25 03/19/25 0721   Time of last liquid 0630 03/19/25 0721   Date of last solid 03/17/25 03/19/25 0721   Time of last solid 1800 03/19/25 0721

## 2025-03-24 PROCEDURE — 88305 TISSUE EXAM BY PATHOLOGIST: CPT | Performed by: SPECIALIST

## 2025-03-26 ENCOUNTER — RESULTS FOLLOW-UP (OUTPATIENT)
Dept: GASTROENTEROLOGY | Facility: AMBULARY SURGERY CENTER | Age: 60
End: 2025-03-26

## 2025-03-26 NOTE — TELEPHONE ENCOUNTER
Attempted to call pt regarding colonoscopy results & provider recommendations, however, I received voicemail. Advised to return call to office to discuss. 3 year colon recall set.     Please, relay results if pt returns call. Thanks!

## 2025-03-26 NOTE — TELEPHONE ENCOUNTER
----- Message from Vandana Giron MD sent at 3/26/2025  8:32 AM EDT -----  Repeat colonoscopy in 3 years.

## 2025-03-27 ENCOUNTER — OFFICE VISIT (OUTPATIENT)
Age: 60
End: 2025-03-27

## 2025-03-27 VITALS
SYSTOLIC BLOOD PRESSURE: 133 MMHG | HEIGHT: 63 IN | OXYGEN SATURATION: 99 % | HEART RATE: 68 BPM | BODY MASS INDEX: 25.16 KG/M2 | WEIGHT: 142 LBS | DIASTOLIC BLOOD PRESSURE: 83 MMHG

## 2025-03-27 DIAGNOSIS — L30.9 DERMATITIS OF EXTERNAL EAR: Primary | ICD-10-CM

## 2025-03-27 PROCEDURE — 99213 OFFICE O/P EST LOW 20 MIN: CPT | Performed by: FAMILY MEDICINE

## 2025-03-27 RX ORDER — TRIAMCINOLONE ACETONIDE 1 MG/G
CREAM TOPICAL 2 TIMES DAILY
Qty: 15 G | Refills: 0 | Status: SHIPPED | OUTPATIENT
Start: 2025-03-27

## 2025-03-27 NOTE — PROGRESS NOTES
Name: Brissa Kowalski      : 1965      MRN: [id number]  Encounter Provider: Delia Diaz MD  Encounter Date: 3/27/2025   Encounter department: [facility]  :  Assessment & Plan  Dermatitis of external ear  2-week history of ear dermatitis.  Physical exam demonstrated dry, flaky rash involving bilateral external ears and external ear canal.  No involvement of the internal ear canal or tympanic membrane.  Denies any new exposures to creams, lotions, soaps, fragrances.  Does not apply any lotions or OTC treatments to the ear.  Patient may apply Kenalog cream twice a day for 7 days  Patient may apply fragrance free lotion or Aquaphor to the ear twice a day  Patient may follow-up if symptoms persist or worsen    Orders:    triamcinolone (KENALOG) 0.1 % cream; Apply topically 2 (two) times a day           History of Present Illness   Patient presents with 2-week history of bilateral ear rash and itching.  She notes that in the past 2 weeks, she has noticed some flaky skin in both of her ears and some increased itchiness.  She does not know if she scratched her ear but she once noted some blood from one of her ears.  She does not believe that this ever happened before.  She has a history of psoriasis.      Review of Systems   Constitutional:  Negative for chills and fever.   HENT:  Negative for ear pain and sore throat.         Ear rash   Eyes:  Negative for pain and visual disturbance.   Respiratory:  Negative for cough and shortness of breath.    Cardiovascular:  Negative for chest pain and palpitations.   Gastrointestinal:  Negative for abdominal pain and vomiting.   Genitourinary:  Negative for dysuria and hematuria.   Musculoskeletal:  Negative for arthralgias and back pain.   Skin:  Negative for color change and rash.   Neurological:  Negative for seizures and syncope.   All other systems reviewed and are negative.      Objective   /83 (BP Location: Right  "arm, Patient Position: Sitting, Cuff Size: Standard)   Pulse 68   Ht 5' 3\" (1.6 m)   Wt 64.4 kg (142 lb)   SpO2 99%   BMI 25.15 kg/m²      Physical Exam  Constitutional:       Appearance: Normal appearance.   HENT:      Head: Normocephalic.      Right Ear: Tympanic membrane, ear canal and external ear normal.      Left Ear: Tympanic membrane, ear canal and external ear normal.      Ears:      Comments: Dry, flaky rash involving bilateral external ears and external ear canal.  No involvement of the inner ear canal or tympanic membranes     Nose: Nose normal.   Cardiovascular:      Rate and Rhythm: Normal rate and regular rhythm.      Pulses: Normal pulses.      Heart sounds: Normal heart sounds.   Pulmonary:      Effort: Pulmonary effort is normal.      Breath sounds: Normal breath sounds.   Abdominal:      General: Abdomen is flat.   Musculoskeletal:         General: Normal range of motion.      Cervical back: Normal range of motion.   Skin:     General: Skin is warm.      Findings: Rash present.   Neurological:      General: No focal deficit present.      Mental Status: She is alert.       "

## 2025-03-27 NOTE — PATIENT INSTRUCTIONS
Use Kenalog cream twice a day for 7 days on the itchy dry skin of the ears    Apply a fragrance free lotion or Aquaphor on the ear daily. (Aveeno, Eucerin, Fartun, Vanicream)

## 2025-03-28 NOTE — TELEPHONE ENCOUNTER
Attempted to call pt regarding colonoscopy results & provider recommendations, however, I received voicemail. Advised to return call to office to discuss. Recall entered. Second attempt letter sent.    Please, relay results if pt returns call. Thanks!

## (undated) DEVICE — SYRINGE 5ML LL

## (undated) DEVICE — RADIOLOGY STERILE LABELS: Brand: CENTURION

## (undated) DEVICE — GLOVE SRG BIOGEL 7.5

## (undated) DEVICE — SKIN MARKER DUAL TIP WITH RULER CAP, FLEXIBLE RULER AND LABELS: Brand: DEVON

## (undated) DEVICE — WIPES BABY PAMPERS SENSITIVE 36/PK

## (undated) DEVICE — TOWEL SET X-RAY

## (undated) DEVICE — TRAY EPID CONT PERIFIX 18G X 3.5IN 5ML CLSD TIP DRAPE

## (undated) DEVICE — CHLORAPREP APPLICATOR TINTED 10.5ML ONE-STEP

## (undated) DEVICE — SMALL NEEDLE COUNTER NEST

## (undated) DEVICE — PLASTIC ADHESIVE BANDAGE: Brand: CURITY

## (undated) DEVICE — NEEDLE SPINAL 22G X 5IN QUINCKE

## (undated) DEVICE — NEEDLE BLUNT 18 G X 1 1/2 W FILTER

## (undated) DEVICE — NEEDLE SPINAL 25G X 3.5 IN QUINCKE